# Patient Record
Sex: FEMALE | Race: OTHER | NOT HISPANIC OR LATINO
[De-identification: names, ages, dates, MRNs, and addresses within clinical notes are randomized per-mention and may not be internally consistent; named-entity substitution may affect disease eponyms.]

---

## 2018-12-20 ENCOUNTER — APPOINTMENT (OUTPATIENT)
Dept: HEART AND VASCULAR | Facility: CLINIC | Age: 78
End: 2018-12-20
Payer: MEDICARE

## 2018-12-20 VITALS — SYSTOLIC BLOOD PRESSURE: 130 MMHG | DIASTOLIC BLOOD PRESSURE: 70 MMHG

## 2018-12-20 VITALS — BODY MASS INDEX: 28.72 KG/M2 | HEIGHT: 67 IN | WEIGHT: 183 LBS

## 2018-12-20 DIAGNOSIS — Z87.39 PERSONAL HISTORY OF OTHER DISEASES OF THE MUSCULOSKELETAL SYSTEM AND CONNECTIVE TISSUE: ICD-10-CM

## 2018-12-20 PROCEDURE — 93000 ELECTROCARDIOGRAM COMPLETE: CPT

## 2018-12-20 PROCEDURE — 93306 TTE W/DOPPLER COMPLETE: CPT

## 2018-12-20 PROCEDURE — 93880 EXTRACRANIAL BILAT STUDY: CPT

## 2018-12-20 PROCEDURE — 99214 OFFICE O/P EST MOD 30 MIN: CPT

## 2018-12-20 NOTE — PHYSICAL EXAM
[General Appearance - Well Developed] : well developed [Normal Appearance] : normal appearance [Well Groomed] : well groomed [General Appearance - Well Nourished] : well nourished [No Deformities] : no deformities [General Appearance - In No Acute Distress] : no acute distress [Normal Conjunctiva] : the conjunctiva exhibited no abnormalities [Eyelids - No Xanthelasma] : the eyelids demonstrated no xanthelasmas [Normal Oral Mucosa] : normal oral mucosa [No Oral Pallor] : no oral pallor [No Oral Cyanosis] : no oral cyanosis [Normal Jugular Venous A Waves Present] : normal jugular venous A waves present [Normal Jugular Venous V Waves Present] : normal jugular venous V waves present [No Jugular Venous Barrios A Waves] : no jugular venous barrios A waves [Respiration, Rhythm And Depth] : normal respiratory rhythm and effort [Exaggerated Use Of Accessory Muscles For Inspiration] : no accessory muscle use [Auscultation Breath Sounds / Voice Sounds] : lungs were clear to auscultation bilaterally [Normal] : normal [Rhythm Regular] : regular [Normal S1] : normal S1 [Normal S2] : normal S2 [II] : a grade 2 [Right Carotid Bruit] : right carotid bruit heard [Left Carotid Bruit] : left carotid bruit heard [Abdomen Soft] : soft [Abdomen Tenderness] : non-tender [Abdomen Mass (___ Cm)] : no abdominal mass palpated [Abnormal Walk] : normal gait [Gait - Sufficient For Exercise Testing] : the gait was sufficient for exercise testing [Nail Clubbing] : no clubbing of the fingernails [Cyanosis, Localized] : no localized cyanosis [Petechial Hemorrhages (___cm)] : no petechial hemorrhages [Skin Color & Pigmentation] : normal skin color and pigmentation [] : no rash [No Venous Stasis] : no venous stasis [Skin Lesions] : no skin lesions [No Skin Ulcers] : no skin ulcer [No Xanthoma] : no  xanthoma was observed

## 2018-12-20 NOTE — ASSESSMENT
[FreeTextEntry1] : Echocardiogram revealed thickening of the aortic valve with mild degree of stenosis and mild to moderate aortic insufficiency chamber size is nondilated and LV function is well-preserved

## 2018-12-20 NOTE — HISTORY OF PRESENT ILLNESS
[FreeTextEntry1] : Patient is an 78-year-old white female with a history of hyperlipidemia hypertension long-standing aortic insufficiency. Patient returns for routine followup her mobility is severely limited due to ongoing back pain for which she has received pain injections with some relief. She has intermittent dizziness and ataxia which affects her balance
Please see your doctor in next few days. Take Theraflu as directed. Drink lots of fluids and rest. Come back if you develop severe headache, chest pain, trouble breathing, leg swelling, stiff neck, or not able to hold down liquids.

## 2019-06-06 ENCOUNTER — APPOINTMENT (OUTPATIENT)
Dept: HEART AND VASCULAR | Facility: CLINIC | Age: 79
End: 2019-06-06
Payer: MEDICARE

## 2019-06-06 ENCOUNTER — RESULT CHARGE (OUTPATIENT)
Age: 79
End: 2019-06-06

## 2019-06-06 VITALS — WEIGHT: 185 LBS | HEIGHT: 67 IN | BODY MASS INDEX: 29.03 KG/M2

## 2019-06-06 VITALS — SYSTOLIC BLOOD PRESSURE: 138 MMHG | DIASTOLIC BLOOD PRESSURE: 80 MMHG

## 2019-06-06 PROCEDURE — 93000 ELECTROCARDIOGRAM COMPLETE: CPT

## 2019-06-06 PROCEDURE — 99214 OFFICE O/P EST MOD 30 MIN: CPT

## 2019-06-06 NOTE — HISTORY OF PRESENT ILLNESS
[FreeTextEntry1] : Patient is an 78-year-old white female with a history of hyperlipidemia hypertension long-standing aortic insufficiency. Patient returns for routine followup her mobility is severely limited due to ongoing back pain for which she has received pain injections with some relief. She has intermittent dizziness and ataxia which affects her balance\par Worseing leg varicose veinsLeft>rIight with pain while walking and periodic swelling

## 2019-06-06 NOTE — PHYSICAL EXAM
[Well Groomed] : well groomed [General Appearance - Well Developed] : well developed [Normal Appearance] : normal appearance [General Appearance - Well Nourished] : well nourished [No Deformities] : no deformities [General Appearance - In No Acute Distress] : no acute distress [Normal Conjunctiva] : the conjunctiva exhibited no abnormalities [Eyelids - No Xanthelasma] : the eyelids demonstrated no xanthelasmas [No Oral Cyanosis] : no oral cyanosis [No Oral Pallor] : no oral pallor [Normal Oral Mucosa] : normal oral mucosa [Normal Jugular Venous A Waves Present] : normal jugular venous A waves present [Normal Jugular Venous V Waves Present] : normal jugular venous V waves present [No Jugular Venous Barrios A Waves] : no jugular venous barrios A waves [Auscultation Breath Sounds / Voice Sounds] : lungs were clear to auscultation bilaterally [Respiration, Rhythm And Depth] : normal respiratory rhythm and effort [Exaggerated Use Of Accessory Muscles For Inspiration] : no accessory muscle use [Abdomen Soft] : soft [Abdomen Tenderness] : non-tender [Gait - Sufficient For Exercise Testing] : the gait was sufficient for exercise testing [Abdomen Mass (___ Cm)] : no abdominal mass palpated [Abnormal Walk] : normal gait [Cyanosis, Localized] : no localized cyanosis [Nail Clubbing] : no clubbing of the fingernails [Petechial Hemorrhages (___cm)] : no petechial hemorrhages [] : no rash [Skin Color & Pigmentation] : normal skin color and pigmentation [No Venous Stasis] : no venous stasis [Skin Lesions] : no skin lesions [No Skin Ulcers] : no skin ulcer [Normal] : normal [No Xanthoma] : no  xanthoma was observed [Rhythm Regular] : regular [Normal S1] : normal S1 [Normal S2] : normal S2 [II] : a grade 2 [Right Carotid Bruit] : right carotid bruit heard [Left Carotid Bruit] : left carotid bruit heard [___ +] : bilateral [unfilled]U+ pitting edema to the ankles [Lt] : varicose veins of the left leg noted

## 2019-06-10 ENCOUNTER — APPOINTMENT (OUTPATIENT)
Dept: HEART AND VASCULAR | Facility: CLINIC | Age: 79
End: 2019-06-10
Payer: MEDICARE

## 2019-06-10 PROCEDURE — 93970 EXTREMITY STUDY: CPT

## 2019-07-08 ENCOUNTER — APPOINTMENT (OUTPATIENT)
Dept: HEART AND VASCULAR | Facility: CLINIC | Age: 79
End: 2019-07-08
Payer: MEDICARE

## 2019-07-08 VITALS — BODY MASS INDEX: 28.88 KG/M2 | WEIGHT: 184 LBS | HEIGHT: 67 IN

## 2019-07-08 VITALS — DIASTOLIC BLOOD PRESSURE: 80 MMHG | SYSTOLIC BLOOD PRESSURE: 130 MMHG

## 2019-07-08 DIAGNOSIS — F41.9 ANXIETY DISORDER, UNSPECIFIED: ICD-10-CM

## 2019-07-08 PROCEDURE — 93000 ELECTROCARDIOGRAM COMPLETE: CPT

## 2019-07-08 NOTE — HISTORY OF PRESENT ILLNESS
[FreeTextEntry1] : 79-year-old female with hypertension and aortic insufficiency who while over the weekend had excess salt intake was seen and evaluated at a local hospital for spike and blood pressure to 170/110. She denies any symptoms of headache dizziness chest pain shortness of breath diplopia or epistaxis. While in the emergency room her blood pressure spontaneous else to 130/80 with no intervention she had taken an extra half atenolol prior to going to the ER. She is currently preop for a colonoscopy to the [facility] cologaurd \par the patient reports moderate distress related to the upcoming colonoscopy

## 2019-12-12 ENCOUNTER — APPOINTMENT (OUTPATIENT)
Dept: HEART AND VASCULAR | Facility: CLINIC | Age: 79
End: 2019-12-12
Payer: MEDICARE

## 2019-12-12 ENCOUNTER — NON-APPOINTMENT (OUTPATIENT)
Age: 79
End: 2019-12-12

## 2019-12-12 VITALS
DIASTOLIC BLOOD PRESSURE: 75 MMHG | BODY MASS INDEX: 28.72 KG/M2 | HEART RATE: 68 BPM | SYSTOLIC BLOOD PRESSURE: 132 MMHG | HEIGHT: 67 IN | WEIGHT: 183 LBS

## 2019-12-12 PROCEDURE — 93880 EXTRACRANIAL BILAT STUDY: CPT

## 2019-12-12 PROCEDURE — 93000 ELECTROCARDIOGRAM COMPLETE: CPT

## 2019-12-12 PROCEDURE — 93306 TTE W/DOPPLER COMPLETE: CPT

## 2019-12-12 PROCEDURE — 99214 OFFICE O/P EST MOD 30 MIN: CPT

## 2019-12-12 NOTE — PHYSICAL EXAM
[General Appearance - Well Developed] : well developed [Normal Appearance] : normal appearance [No Deformities] : no deformities [Well Groomed] : well groomed [General Appearance - Well Nourished] : well nourished [Normal Conjunctiva] : the conjunctiva exhibited no abnormalities [General Appearance - In No Acute Distress] : no acute distress [Normal Oral Mucosa] : normal oral mucosa [No Oral Pallor] : no oral pallor [Eyelids - No Xanthelasma] : the eyelids demonstrated no xanthelasmas [Normal Jugular Venous A Waves Present] : normal jugular venous A waves present [Normal Jugular Venous V Waves Present] : normal jugular venous V waves present [No Oral Cyanosis] : no oral cyanosis [No Jugular Venous Barrios A Waves] : no jugular venous barrios A waves [Exaggerated Use Of Accessory Muscles For Inspiration] : no accessory muscle use [Respiration, Rhythm And Depth] : normal respiratory rhythm and effort [Auscultation Breath Sounds / Voice Sounds] : lungs were clear to auscultation bilaterally [Heart Rate And Rhythm] : heart rate and rhythm were normal [Heart Sounds] : normal S1 and S2 [Murmurs] : no murmurs present [Abdomen Soft] : soft [Abdomen Tenderness] : non-tender [Abnormal Walk] : normal gait [Abdomen Mass (___ Cm)] : no abdominal mass palpated [Nail Clubbing] : no clubbing of the fingernails [Gait - Sufficient For Exercise Testing] : the gait was sufficient for exercise testing [Petechial Hemorrhages (___cm)] : no petechial hemorrhages [Cyanosis, Localized] : no localized cyanosis [] : no ischemic changes

## 2019-12-18 NOTE — HISTORY OF PRESENT ILLNESS
[FreeTextEntry1] : 80 followed for AV disease\par had extensive GI eval recently\par Has mod dyspnea which is worse over 2-3 mos \par occasional imbalance as well

## 2019-12-18 NOTE — ASSESSMENT
[FreeTextEntry1] : iMPRESSION Lvef on echo 55% MIULD VHD \par Carotid Nl LICA JESSICA and VERT arteries \par contine curent meds \par xanax prn \par target LDL <100

## 2020-05-27 ENCOUNTER — APPOINTMENT (OUTPATIENT)
Dept: HEART AND VASCULAR | Facility: CLINIC | Age: 80
End: 2020-05-27
Payer: MEDICARE

## 2020-05-27 ENCOUNTER — NON-APPOINTMENT (OUTPATIENT)
Age: 80
End: 2020-05-27

## 2020-05-27 VITALS
BODY MASS INDEX: 28.56 KG/M2 | SYSTOLIC BLOOD PRESSURE: 120 MMHG | WEIGHT: 182 LBS | HEIGHT: 67 IN | DIASTOLIC BLOOD PRESSURE: 70 MMHG

## 2020-05-27 PROCEDURE — 93000 ELECTROCARDIOGRAM COMPLETE: CPT

## 2020-05-27 PROCEDURE — 36415 COLL VENOUS BLD VENIPUNCTURE: CPT

## 2020-05-27 PROCEDURE — 99214 OFFICE O/P EST MOD 30 MIN: CPT

## 2020-05-28 NOTE — ASSESSMENT
[FreeTextEntry1] : Assessment \par pain syndrome is suggestive of cervical radiculopathy\par PT recommended \par tylenol prn

## 2020-05-28 NOTE — HISTORY OF PRESENT ILLNESS
[FreeTextEntry1] : Patient has been in isolation for covid \par Has had left neck and left arm pain radiating in nature usually after nights sleep No chest pain or dyspnea \par Has prior h/o cervical DJD

## 2020-05-28 NOTE — REVIEW OF SYSTEMS
[Joint Pain] : joint pain [Muscle Cramps] : muscle cramps [Shortness Of Breath] : no shortness of breath [Chest Pain] : no chest pain [Skin: A Rash] : no rash: [Skin Lesions] : no skin lesions

## 2020-05-28 NOTE — PHYSICAL EXAM
[General Appearance - Well Developed] : well developed [Normal Appearance] : normal appearance [Well Groomed] : well groomed [General Appearance - Well Nourished] : well nourished [No Deformities] : no deformities [General Appearance - In No Acute Distress] : no acute distress [Normal Conjunctiva] : the conjunctiva exhibited no abnormalities [Eyelids - No Xanthelasma] : the eyelids demonstrated no xanthelasmas [Normal Oral Mucosa] : normal oral mucosa [No Oral Pallor] : no oral pallor [No Oral Cyanosis] : no oral cyanosis [Normal Jugular Venous A Waves Present] : normal jugular venous A waves present [Normal Jugular Venous V Waves Present] : normal jugular venous V waves present [No Jugular Venous Barrios A Waves] : no jugular venous barrios A waves [] : no respiratory distress [Respiration, Rhythm And Depth] : normal respiratory rhythm and effort [Exaggerated Use Of Accessory Muscles For Inspiration] : no accessory muscle use [Auscultation Breath Sounds / Voice Sounds] : lungs were clear to auscultation bilaterally [II] : a grade 2 [FreeTextEntry1] : pinpoint pain over left neck Mild pain w abduction left arm

## 2020-12-02 ENCOUNTER — NON-APPOINTMENT (OUTPATIENT)
Age: 80
End: 2020-12-02

## 2020-12-02 ENCOUNTER — APPOINTMENT (OUTPATIENT)
Dept: HEART AND VASCULAR | Facility: CLINIC | Age: 80
End: 2020-12-02
Payer: MEDICARE

## 2020-12-02 VITALS
BODY MASS INDEX: 28.25 KG/M2 | SYSTOLIC BLOOD PRESSURE: 130 MMHG | HEART RATE: 71 BPM | HEIGHT: 67 IN | DIASTOLIC BLOOD PRESSURE: 80 MMHG | WEIGHT: 180 LBS

## 2020-12-02 DIAGNOSIS — R26.9 UNSPECIFIED ABNORMALITIES OF GAIT AND MOBILITY: ICD-10-CM

## 2020-12-02 PROCEDURE — 99214 OFFICE O/P EST MOD 30 MIN: CPT

## 2020-12-02 PROCEDURE — 93000 ELECTROCARDIOGRAM COMPLETE: CPT

## 2020-12-04 NOTE — ASSESSMENT
[FreeTextEntry1] : Assessment \par Echo f/u for AV disease \par Carotid for vertigo symptoms \par meds reviewed

## 2020-12-04 NOTE — HISTORY OF PRESENT ILLNESS
[FreeTextEntry1] : Minimal activity outside of house \par Had severe vertigo worse w movement lasted 2 hours and self resolved has C spine pain along w back pain

## 2020-12-15 ENCOUNTER — APPOINTMENT (OUTPATIENT)
Dept: HEART AND VASCULAR | Facility: CLINIC | Age: 80
End: 2020-12-15
Payer: MEDICARE

## 2020-12-15 PROCEDURE — 93306 TTE W/DOPPLER COMPLETE: CPT

## 2020-12-15 PROCEDURE — 93880 EXTRACRANIAL BILAT STUDY: CPT

## 2021-05-04 ENCOUNTER — NON-APPOINTMENT (OUTPATIENT)
Age: 81
End: 2021-05-04

## 2021-05-04 ENCOUNTER — APPOINTMENT (OUTPATIENT)
Dept: HEART AND VASCULAR | Facility: CLINIC | Age: 81
End: 2021-05-04
Payer: MEDICARE

## 2021-05-04 VITALS
HEIGHT: 67 IN | DIASTOLIC BLOOD PRESSURE: 80 MMHG | WEIGHT: 186 LBS | BODY MASS INDEX: 29.19 KG/M2 | SYSTOLIC BLOOD PRESSURE: 130 MMHG | HEART RATE: 75 BPM

## 2021-05-04 PROCEDURE — 99213 OFFICE O/P EST LOW 20 MIN: CPT

## 2021-05-04 PROCEDURE — 93000 ELECTROCARDIOGRAM COMPLETE: CPT

## 2021-05-04 NOTE — PHYSICAL EXAM
[5th Left ICS - MCL] : palpated at the 5th LICS in the midclavicular line [Rhythm Regular] : regular [Normal S1] : normal S1 [Normal S2] : normal S2 [Right Carotid Bruit] : right carotid bruit heard [Left Carotid Bruit] : left carotid bruit heard [General Appearance - Well Developed] : well developed [Normal Appearance] : normal appearance [Well Groomed] : well groomed [General Appearance - Well Nourished] : well nourished [No Deformities] : no deformities [General Appearance - In No Acute Distress] : no acute distress [Normal Conjunctiva] : the conjunctiva exhibited no abnormalities [Eyelids - No Xanthelasma] : the eyelids demonstrated no xanthelasmas [Normal Oral Mucosa] : normal oral mucosa [No Oral Pallor] : no oral pallor [No Oral Cyanosis] : no oral cyanosis [Normal Jugular Venous A Waves Present] : normal jugular venous A waves present [Normal Jugular Venous V Waves Present] : normal jugular venous V waves present [No Jugular Venous Barrios A Waves] : no jugular venous barrios A waves [] : no respiratory distress [Respiration, Rhythm And Depth] : normal respiratory rhythm and effort [Exaggerated Use Of Accessory Muscles For Inspiration] : no accessory muscle use [Auscultation Breath Sounds / Voice Sounds] : lungs were clear to auscultation bilaterally [FreeTextEntry1] : pinpoint pain over left neck Mild pain w abduction left arm  [II] : a grade 2

## 2021-05-04 NOTE — HISTORY OF PRESENT ILLNESS
[FreeTextEntry1] : Minimal activity outside of house \par Had severe vertigo worse w movement lasted 2 hours and self resolved has C spine pain along w back pain\par recent LDL 11 down from 128

## 2021-05-04 NOTE — ASSESSMENT
[FreeTextEntry1] : Assessment \par LDL at goal \par NBp stable \par Ekg unchanged \par risk factor changes discussed

## 2021-12-16 ENCOUNTER — APPOINTMENT (OUTPATIENT)
Dept: HEART AND VASCULAR | Facility: CLINIC | Age: 81
End: 2021-12-16
Payer: MEDICARE

## 2021-12-16 ENCOUNTER — NON-APPOINTMENT (OUTPATIENT)
Age: 81
End: 2021-12-16

## 2021-12-16 VITALS
HEART RATE: 77 BPM | SYSTOLIC BLOOD PRESSURE: 125 MMHG | BODY MASS INDEX: 28.25 KG/M2 | HEIGHT: 67 IN | RESPIRATION RATE: 14 BRPM | DIASTOLIC BLOOD PRESSURE: 75 MMHG | WEIGHT: 180 LBS

## 2021-12-16 PROCEDURE — 93306 TTE W/DOPPLER COMPLETE: CPT

## 2021-12-16 PROCEDURE — 93000 ELECTROCARDIOGRAM COMPLETE: CPT

## 2021-12-16 PROCEDURE — 99214 OFFICE O/P EST MOD 30 MIN: CPT

## 2021-12-16 NOTE — PHYSICAL EXAM
[5th Left ICS - MCL] : palpated at the 5th LICS in the midclavicular line [Rhythm Regular] : regular [Normal S1] : normal S1 [Normal S2] : normal S2 [Right Carotid Bruit] : right carotid bruit heard [Left Carotid Bruit] : left carotid bruit heard 110 [General Appearance - Well Developed] : well developed [Normal Appearance] : normal appearance [Well Groomed] : well groomed [General Appearance - Well Nourished] : well nourished [No Deformities] : no deformities [General Appearance - In No Acute Distress] : no acute distress [Normal Conjunctiva] : the conjunctiva exhibited no abnormalities [Eyelids - No Xanthelasma] : the eyelids demonstrated no xanthelasmas [Normal Oral Mucosa] : normal oral mucosa [No Oral Pallor] : no oral pallor [No Oral Cyanosis] : no oral cyanosis [Normal Jugular Venous A Waves Present] : normal jugular venous A waves present [Normal Jugular Venous V Waves Present] : normal jugular venous V waves present [No Jugular Venous Barrios A Waves] : no jugular venous barrios A waves [] : no respiratory distress [Exaggerated Use Of Accessory Muscles For Inspiration] : no accessory muscle use [Respiration, Rhythm And Depth] : normal respiratory rhythm and effort [Auscultation Breath Sounds / Voice Sounds] : lungs were clear to auscultation bilaterally [II] : a grade 2 [FreeTextEntry1] : pinpoint pain over left neck Mild pain w abduction left arm

## 2021-12-16 NOTE — HISTORY OF PRESENT ILLNESS
[FreeTextEntry1] : walking limited by severe LBP and knee pain \par no dizziness or sscp mild leg edema \par no change in dyspnea pattern \par no syystemic complaints

## 2022-06-09 ENCOUNTER — NON-APPOINTMENT (OUTPATIENT)
Age: 82
End: 2022-06-09

## 2022-06-09 ENCOUNTER — APPOINTMENT (OUTPATIENT)
Dept: HEART AND VASCULAR | Facility: CLINIC | Age: 82
End: 2022-06-09
Payer: MEDICARE

## 2022-06-09 VITALS
RESPIRATION RATE: 14 BRPM | WEIGHT: 186 LBS | BODY MASS INDEX: 29.19 KG/M2 | DIASTOLIC BLOOD PRESSURE: 78 MMHG | HEIGHT: 67 IN | SYSTOLIC BLOOD PRESSURE: 125 MMHG | HEART RATE: 83 BPM

## 2022-06-09 DIAGNOSIS — I83.019 VARICOSE VEINS OF RIGHT LOWER EXTREMITY WITH ULCER OF UNSPECIFIED SITE: ICD-10-CM

## 2022-06-09 DIAGNOSIS — L97.929 VARICOSE VEINS OF RIGHT LOWER EXTREMITY WITH ULCER OF UNSPECIFIED SITE: ICD-10-CM

## 2022-06-09 DIAGNOSIS — L97.919 VARICOSE VEINS OF RIGHT LOWER EXTREMITY WITH ULCER OF UNSPECIFIED SITE: ICD-10-CM

## 2022-06-09 DIAGNOSIS — I83.029 VARICOSE VEINS OF RIGHT LOWER EXTREMITY WITH ULCER OF UNSPECIFIED SITE: ICD-10-CM

## 2022-06-09 PROCEDURE — 93000 ELECTROCARDIOGRAM COMPLETE: CPT

## 2022-06-09 PROCEDURE — 99214 OFFICE O/P EST MOD 30 MIN: CPT

## 2022-06-09 NOTE — ASSESSMENT
[FreeTextEntry1] : Assessment \par BP Mekg unchnanged \par AV disease stable on exam no signs of chf \par was off statin for a period is now back on taget ldl <100

## 2022-06-09 NOTE — PHYSICAL EXAM
[5th Left ICS - MCL] : palpated at the 5th LICS in the midclavicular line [Rhythm Regular] : regular [Normal S1] : normal S1 [Normal S2] : normal S2 [Right Carotid Bruit] : right carotid bruit heard [Left Carotid Bruit] : left carotid bruit heard [General Appearance - Well Developed] : well developed [Normal Appearance] : normal appearance [Well Groomed] : well groomed [General Appearance - Well Nourished] : well nourished [No Deformities] : no deformities [General Appearance - In No Acute Distress] : no acute distress [Normal Conjunctiva] : the conjunctiva exhibited no abnormalities [Eyelids - No Xanthelasma] : the eyelids demonstrated no xanthelasmas [Normal Oral Mucosa] : normal oral mucosa [No Oral Pallor] : no oral pallor [No Oral Cyanosis] : no oral cyanosis [Normal Jugular Venous A Waves Present] : normal jugular venous A waves present [Normal Jugular Venous V Waves Present] : normal jugular venous V waves present [No Jugular Venous Barrios A Waves] : no jugular venous barrios A waves [] : no respiratory distress [Respiration, Rhythm And Depth] : normal respiratory rhythm and effort [Exaggerated Use Of Accessory Muscles For Inspiration] : no accessory muscle use [Auscultation Breath Sounds / Voice Sounds] : lungs were clear to auscultation bilaterally [II] : a grade 2 [FreeTextEntry1] : pinpoint pain over left neck Mild pain w abduction left arm

## 2022-06-09 NOTE — HISTORY OF PRESENT ILLNESS
[FreeTextEntry1] : walking limited by severe LBP and knee pain \par no dizziness or sscp mild leg edema \par no change in dyspnea pattern \par no systemic complaints

## 2022-12-22 ENCOUNTER — APPOINTMENT (OUTPATIENT)
Dept: HEART AND VASCULAR | Facility: CLINIC | Age: 82
End: 2022-12-22
Payer: MEDICARE

## 2022-12-22 ENCOUNTER — NON-APPOINTMENT (OUTPATIENT)
Age: 82
End: 2022-12-22

## 2022-12-22 VITALS
WEIGHT: 174 LBS | HEIGHT: 67 IN | DIASTOLIC BLOOD PRESSURE: 85 MMHG | HEART RATE: 79 BPM | BODY MASS INDEX: 27.31 KG/M2 | SYSTOLIC BLOOD PRESSURE: 134 MMHG

## 2022-12-22 DIAGNOSIS — R27.0 ATAXIA, UNSPECIFIED: ICD-10-CM

## 2022-12-22 PROCEDURE — 93880 EXTRACRANIAL BILAT STUDY: CPT

## 2022-12-22 PROCEDURE — ZZZZZ: CPT

## 2022-12-22 PROCEDURE — 99214 OFFICE O/P EST MOD 30 MIN: CPT | Mod: 25

## 2022-12-22 PROCEDURE — 93306 TTE W/DOPPLER COMPLETE: CPT

## 2022-12-22 PROCEDURE — 93000 ELECTROCARDIOGRAM COMPLETE: CPT | Mod: 59

## 2022-12-22 NOTE — ADDENDUM
[FreeTextEntry1] : I, Romain Daniel, assisted in documentation on 12/22/2022 acting as a scribe for Dr. Parviz Peterson.\par \par

## 2022-12-22 NOTE — DISCUSSION/SUMMARY
[FreeTextEntry1] : The impression is hyperlipidemia. Currently, this condition is stable. Other planned treatment includes diet modification. The plan was discussed with the patient.\par Hypertension: The impression is hypertension. Currently, the condition is stable. The plan was discussed with the patient.\par AV dsease stable no intervention needed  [EKG obtained to assist in diagnosis and management of assessed problem(s)] : EKG obtained to assist in diagnosis and management of assessed problem(s)

## 2022-12-22 NOTE — HISTORY OF PRESENT ILLNESS
[FreeTextEntry1] : 6/9/22 walking limited by severe LBP and knee pain \par no dizziness or sscp mild leg edema \par no change in dyspnea pattern \par no systemic complaints \par 12/22/22\par Denies Chest Pain, SOB, Dizziness, Leg edema, Orthopnea, PND, Palpitations, Syncope, WHITE, Diaphoresis.\par Had covid 8/22 followed by facial shingles seen by Dr Dwyer on GABAPENTIN for neuropathy \par some vision loss

## 2022-12-22 NOTE — ASSESSMENT
[FreeTextEntry1] : HTN (hypertension) (401.9) (I10)\par Aortic valve insufficiency, acquired (424.1) (I35.1)\par Venous stasis ulcers of both lower extremities (459.81,707.10)\par (I83.019,I83.029,L97.919,L97.929)\par Hyperlipidemia (272.4) (E78.5)\par Lvef 55% MILD AS MILD AI MR \par CAROTID IRR Plaque no obstructive\par \par Assessment \par BP ekg unchanged \par AV disease stable on exam no signs of chf \par was off statin for a period is now back on target ldl <100.

## 2022-12-22 NOTE — PHYSICAL EXAM
[Well Developed] : well developed [Well Nourished] : well nourished [No Acute Distress] : no acute distress [Normal Conjunctiva] : normal conjunctiva [Normal Venous Pressure] : normal venous pressure [No Carotid Bruit] : no carotid bruit [Clear Lung Fields] : clear lung fields [Good Air Entry] : good air entry [No Respiratory Distress] : no respiratory distress  [Soft] : abdomen soft [Non Tender] : non-tender [No Masses/organomegaly] : no masses/organomegaly [Normal Bowel Sounds] : normal bowel sounds [No Edema] : no edema [No Cyanosis] : no cyanosis [No Clubbing] : no clubbing [No Varicosities] : no varicosities [No Rash] : no rash [No Skin Lesions] : no skin lesions [Moves all extremities] : moves all extremities [No Focal Deficits] : no focal deficits [Normal Speech] : normal speech [Alert and Oriented] : alert and oriented [Normal memory] : normal memory [de-identified] : normal oral mucosa, no oral pallor and no oral cyanosis. [de-identified] : Cardiovascular: The PMI was palpated at the 5th LICS in the midclavicular line. The rhythm was regular. Heart sounds: normal S1, normal S2. \par A grade 2 systolic murmur was heard at the RUSB. \par A grade 2 diastolic murmur was heard at the LLSB. Carotid: right carotid bruit heard and left carotid bruit heard. \par  [de-identified] : pinpoint pain over left neck Mild pain w abduction left arm.

## 2023-06-20 ENCOUNTER — APPOINTMENT (OUTPATIENT)
Dept: HEART AND VASCULAR | Facility: CLINIC | Age: 83
End: 2023-06-20
Payer: MEDICARE

## 2023-06-20 ENCOUNTER — NON-APPOINTMENT (OUTPATIENT)
Age: 83
End: 2023-06-20

## 2023-06-20 VITALS
HEART RATE: 80 BPM | RESPIRATION RATE: 14 BRPM | BODY MASS INDEX: 28.88 KG/M2 | WEIGHT: 184 LBS | DIASTOLIC BLOOD PRESSURE: 72 MMHG | SYSTOLIC BLOOD PRESSURE: 128 MMHG | HEIGHT: 67 IN

## 2023-06-20 PROCEDURE — 99214 OFFICE O/P EST MOD 30 MIN: CPT

## 2023-06-20 PROCEDURE — 93000 ELECTROCARDIOGRAM COMPLETE: CPT

## 2023-06-20 NOTE — DISCUSSION/SUMMARY
[FreeTextEntry1] : The impression is hyperlipidemia. Currently, this condition is stable. Other planned treatment includes diet modification. The plan was discussed with the patient.\par Hypertension: The impression is hypertension. Currently, the condition is stable. The plan was discussed with the patient.\par AV dsease stable no intervention needed. \par \par Reduce saturated and trans fats: These types of fats can raise your LDL cholesterol levels. Avoid or limit foods high in saturated fats, such as fatty meats, full-fat dairy products, butter, lard, and tropical oils like coconut and palm oil. Trans fats are commonly found in processed and fried foods, so it is best to avoid or minimize consumption of items like fried snacks, baked goods and margarine. Choose healthier fats: Include foods rich in unsaturated fats, which can help lower LDL cholesterol levels. Good source of unsaturated fats include avocados, nuts and seeds (such as almonds, walnuts and jin seeds), olive oil and fatty fish like salmon and mackerel.\par Increase dietary fiber: Foods high in soluble fiber can help lower cholesterol levels. Include plenty of fruits, vegetables, whole grains and legumes in your diet. Good sources of soluble fiber includes oats, barley, beans, lentils, apples, oranges, and carrots. Eat lean protein: Choose lean sources of protein, such as skinless poultry, fish, legumes and tofu, instead of fatty meats. If you consume red meat, opt for lean cuts and limit the portion size. Include omega-3 fatty acids: Omega-3 fatty acids can help lower cholesterol levels and reduce the risk of heart disease. Include fatty fish like salmon, trout, and sardines in your diet. If you don't eat fish, consider taking a fish oil supplement after consulting your healthcare provider. Limit cholesterol-rich foods: Reduce your intake of foods high in cholesterol, such as organ meats, shellfish, and egg yolks. While dietary cholesterol, such as organ meats, shellfish and egg yolks. While dietary cholesterol has less impact on blood cholesterol levels compared to saturated and trans fats, it's still beneficial to moderate your consumption. Be mindful of added sugars and refined carbohydrates: High intake of added sugars and refined carbohydrates can raise triglyceride levels and lower HDL cholesterol (the "good" cholesterol). Limit sugary drinks, sweets, white bread, white rice, and other processed foods. Exercise regularly: Regular physical activity, such as a brisk walking, jogging, or cycling, can help improve cholesterol levels. Aim for at least 150 minutes of moderate-intensity aerobic exercise per week, or as recommended by your healthcare provider.\par

## 2023-06-20 NOTE — HISTORY OF PRESENT ILLNESS
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

## 2023-06-20 NOTE — ADDENDUM
[FreeTextEntry1] : I, Romain Daniel, assisted in documentation on 06/20/2023 acting as a scribe for Dr. Parviz Peterson.\par \par

## 2023-06-20 NOTE — ASSESSMENT
[FreeTextEntry1] : HTN (hypertension) (401.9) (I10)\par Aortic valve insufficiency, acquired (424.1) (I35.1)\par Ataxia, unspecified (781.3) (R27.0)\par \par Venous stasis ulcers of both lower extremities (459.81,707.10)\par (I83.019,I83.029,L97.919,L97.929)\par Hyperlipidemia (272.4) (E78.5)\par \par \par Assessment \par BP ekg unchanged \par AV disease stable on exam no signs of chf \par was off statin for a period is now back on target ldl <100. \par \par

## 2023-06-20 NOTE — PHYSICAL EXAM
[Well Developed] : well developed [Well Nourished] : well nourished [No Acute Distress] : no acute distress [Normal Conjunctiva] : normal conjunctiva [Normal Venous Pressure] : normal venous pressure [No Carotid Bruit] : no carotid bruit [5th Left ICS - MCL] : palpated at the 5th LICS in the midclavicular line [Normal S1] : normal S1 [Normal S2] : normal S2 [II] : a grade 2 [Right Carotid Bruit] : right carotid bruit heard [Left Carotid Bruit] : left carotid bruit heard [Clear Lung Fields] : clear lung fields [Good Air Entry] : good air entry [No Respiratory Distress] : no respiratory distress  [Soft] : abdomen soft [Non Tender] : non-tender [No Masses/organomegaly] : no masses/organomegaly [Normal Bowel Sounds] : normal bowel sounds [Normal Gait] : normal gait [No Edema] : no edema [No Cyanosis] : no cyanosis [No Clubbing] : no clubbing [No Varicosities] : no varicosities [No Rash] : no rash [No Skin Lesions] : no skin lesions [Moves all extremities] : moves all extremities [No Focal Deficits] : no focal deficits [Normal Speech] : normal speech [Alert and Oriented] : alert and oriented [Normal memory] : normal memory [Rt] : varicose veins of the right leg noted [Lt] : varicose veins of the left leg noted

## 2024-01-09 ENCOUNTER — APPOINTMENT (OUTPATIENT)
Dept: HEART AND VASCULAR | Facility: CLINIC | Age: 84
End: 2024-01-09
Payer: MEDICARE

## 2024-01-09 ENCOUNTER — NON-APPOINTMENT (OUTPATIENT)
Age: 84
End: 2024-01-09

## 2024-01-09 VITALS
HEIGHT: 67 IN | WEIGHT: 190 LBS | RESPIRATION RATE: 14 BRPM | DIASTOLIC BLOOD PRESSURE: 82 MMHG | SYSTOLIC BLOOD PRESSURE: 130 MMHG | HEART RATE: 76 BPM | BODY MASS INDEX: 29.82 KG/M2

## 2024-01-09 DIAGNOSIS — M54.13: ICD-10-CM

## 2024-01-09 DIAGNOSIS — I35.1 NONRHEUMATIC AORTIC (VALVE) INSUFFICIENCY: ICD-10-CM

## 2024-01-09 DIAGNOSIS — I10 ESSENTIAL (PRIMARY) HYPERTENSION: ICD-10-CM

## 2024-01-09 DIAGNOSIS — E78.5 HYPERLIPIDEMIA, UNSPECIFIED: ICD-10-CM

## 2024-01-09 DIAGNOSIS — I35.0 NONRHEUMATIC AORTIC (VALVE) STENOSIS: ICD-10-CM

## 2024-01-09 PROCEDURE — 99214 OFFICE O/P EST MOD 30 MIN: CPT

## 2024-01-09 PROCEDURE — 93306 TTE W/DOPPLER COMPLETE: CPT

## 2024-01-09 PROCEDURE — 93000 ELECTROCARDIOGRAM COMPLETE: CPT

## 2024-01-09 PROCEDURE — G2211 COMPLEX E/M VISIT ADD ON: CPT

## 2024-01-09 PROCEDURE — 93880 EXTRACRANIAL BILAT STUDY: CPT

## 2024-07-16 ENCOUNTER — NON-APPOINTMENT (OUTPATIENT)
Age: 84
End: 2024-07-16

## 2024-07-16 ENCOUNTER — APPOINTMENT (OUTPATIENT)
Dept: HEART AND VASCULAR | Facility: CLINIC | Age: 84
End: 2024-07-16
Payer: MEDICARE

## 2024-07-16 VITALS
DIASTOLIC BLOOD PRESSURE: 75 MMHG | WEIGHT: 185 LBS | HEART RATE: 79 BPM | SYSTOLIC BLOOD PRESSURE: 130 MMHG | BODY MASS INDEX: 29.03 KG/M2 | RESPIRATION RATE: 14 BRPM | HEIGHT: 67 IN

## 2024-07-16 DIAGNOSIS — I35.0 NONRHEUMATIC AORTIC (VALVE) STENOSIS: ICD-10-CM

## 2024-07-16 DIAGNOSIS — I83.029 VARICOSE VEINS OF RIGHT LOWER EXTREMITY WITH ULCER OF UNSPECIFIED SITE: ICD-10-CM

## 2024-07-16 DIAGNOSIS — L97.919 VARICOSE VEINS OF RIGHT LOWER EXTREMITY WITH ULCER OF UNSPECIFIED SITE: ICD-10-CM

## 2024-07-16 DIAGNOSIS — I10 ESSENTIAL (PRIMARY) HYPERTENSION: ICD-10-CM

## 2024-07-16 DIAGNOSIS — I83.019 VARICOSE VEINS OF RIGHT LOWER EXTREMITY WITH ULCER OF UNSPECIFIED SITE: ICD-10-CM

## 2024-07-16 DIAGNOSIS — L97.929 VARICOSE VEINS OF RIGHT LOWER EXTREMITY WITH ULCER OF UNSPECIFIED SITE: ICD-10-CM

## 2024-07-16 PROCEDURE — 93000 ELECTROCARDIOGRAM COMPLETE: CPT

## 2024-07-16 PROCEDURE — G2211 COMPLEX E/M VISIT ADD ON: CPT

## 2024-07-16 PROCEDURE — 99214 OFFICE O/P EST MOD 30 MIN: CPT

## 2024-07-16 RX ORDER — GABAPENTIN 100 MG/1
100 CAPSULE ORAL
Refills: 0 | Status: ACTIVE | COMMUNITY

## 2025-01-14 ENCOUNTER — APPOINTMENT (OUTPATIENT)
Dept: HEART AND VASCULAR | Facility: CLINIC | Age: 85
End: 2025-01-14
Payer: MEDICARE

## 2025-01-14 ENCOUNTER — NON-APPOINTMENT (OUTPATIENT)
Age: 85
End: 2025-01-14

## 2025-01-14 VITALS
WEIGHT: 185 LBS | HEIGHT: 67 IN | BODY MASS INDEX: 29.03 KG/M2 | HEART RATE: 74 BPM | RESPIRATION RATE: 14 BRPM | DIASTOLIC BLOOD PRESSURE: 85 MMHG | SYSTOLIC BLOOD PRESSURE: 148 MMHG

## 2025-01-14 DIAGNOSIS — Z00.00 ENCOUNTER FOR GENERAL ADULT MEDICAL EXAMINATION W/OUT ABNORMAL FINDINGS: ICD-10-CM

## 2025-01-14 PROCEDURE — G2211 COMPLEX E/M VISIT ADD ON: CPT

## 2025-01-14 PROCEDURE — 93306 TTE W/DOPPLER COMPLETE: CPT

## 2025-01-14 PROCEDURE — 93000 ELECTROCARDIOGRAM COMPLETE: CPT

## 2025-01-14 PROCEDURE — 99214 OFFICE O/P EST MOD 30 MIN: CPT

## 2025-02-03 ENCOUNTER — APPOINTMENT (OUTPATIENT)
Dept: CARDIOTHORACIC SURGERY | Facility: CLINIC | Age: 85
End: 2025-02-03
Payer: MEDICARE

## 2025-02-03 DIAGNOSIS — Z82.49 FAMILY HISTORY OF ISCHEMIC HEART DISEASE AND OTHER DISEASES OF THE CIRCULATORY SYSTEM: ICD-10-CM

## 2025-02-03 PROCEDURE — 99203 OFFICE O/P NEW LOW 30 MIN: CPT | Mod: 2W

## 2025-02-03 RX ORDER — MULTIVIT,IRON,MINERALS/LUTEIN
TABLET ORAL
Refills: 0 | Status: ACTIVE | COMMUNITY

## 2025-02-03 RX ORDER — MELOXICAM 7.5 MG/1
7.5 TABLET ORAL
Qty: 10 | Refills: 0 | Status: ACTIVE | COMMUNITY
Start: 2025-01-23

## 2025-02-03 RX ORDER — VIT A/VIT C/VIT E/ZINC/COPPER 4296-226
CAPSULE ORAL
Refills: 0 | Status: ACTIVE | COMMUNITY

## 2025-02-12 ENCOUNTER — NON-APPOINTMENT (OUTPATIENT)
Age: 85
End: 2025-02-12

## 2025-02-14 VITALS
DIASTOLIC BLOOD PRESSURE: 70 MMHG | HEART RATE: 83 BPM | RESPIRATION RATE: 18 BRPM | WEIGHT: 184.97 LBS | OXYGEN SATURATION: 95 % | SYSTOLIC BLOOD PRESSURE: 158 MMHG | HEIGHT: 67 IN

## 2025-02-14 NOTE — H&P ADULT - HISTORY OF PRESENT ILLNESS
cardiologist : Dr. Wynn  escort:   Pharmacy :     84 y o f with PMH of anxiety disorder, macular degeneration, ocular migraine, ataxia, post herpetic neuralgia, thyroid CA s/p partial thyroidectomy, hemorrhoids, HTN, HLD, HFpEF, LFLG severe AS presented to his cardiologist for consultation of treatment options for AS. Pt is c/o WHITE with moderate exertion and occasional chest tightness lasting 10-15 minutes at rest which she attributes to her anxiety. Pt denies dizziness, syncope, LE edema, PND/orthopnea, palpitations, n/f, fever/chills, blood in the stool.     ECHO 1/14/25 showed EF 55-60%, moderate grade 2 LV diastolic dysfunction, mild MR, mild TR, moderate LVH, moderate to severe AS (PG 45 mmHg, MG 26 mmHg, SUSI 0.82 ), normal BIV function.   Carotid US 1/9/24 showed right proximal, mid and distal ICA 1-19% stenosis; left proximal, mid and distal ICA 1-19% stenosis.    In light of pt's risk factors, CCS 3-4 anginal symptoms and abnormal ECHO, pt is now referred to [facility] for recommended cardiac cath for possible TAVR work-up.  cardiologist : Dr. Wynn  escort: daughter Sonal and  Valentin  Pharmacy : [facility]    84 y o f with PMH of anxiety disorder, macular degeneration, ocular migraine, ataxia, post herpetic neuralgia, thyroid CA s/p partial thyroidectomy, hemorrhoids, HTN, HLD, HFpEF, LFLG severe AS presented to his cardiologist for consultation of treatment options for AS. Pt is c/o WHITE with moderate exertion (however per daughter pt become short of breath after doing chores at home) and occasional chest tightness lasting 10-15 minutes at rest which she attributes to her anxiety/GERD. Pt reports LE edema that has been present intermittently for the past 1-2 years. About 6 months ago pt had a superificial thrombosis on the L LE for which she took 4 weeks of ASA, during that time she had episodes of hemorrhoidal bleeding which resolved after d/c ASA. Pt reports feeling pain when laying flat due to spinal stenosis however denies orthopnea. Pt denies dizziness, syncope, palpitations, n/v/d, fever/chills, hematuria, hematochezia, melena.     ECHO 1/14/25 showed EF 55-60%, moderate grade 2 LV diastolic dysfunction, mild MR, mild TR, moderate LVH, moderate to severe AS (PG 45 mmHg, MG 26 mmHg, SUSI 0.82 ), normal BIV function.   Carotid US 1/9/24 showed right proximal, mid and distal ICA 1-19% stenosis; left proximal, mid and distal ICA 1-19% stenosis.      In light of pt's risk factors, CCS 3-4 anginal symptoms and abnormal ECHO, pt is now referred to [facility] for recommended cardiac cath for possible TAVR work-up.

## 2025-02-14 NOTE — H&P ADULT - NSICDXPASTSURGICALHX_GEN_ALL_CORE_FT
PAST SURGICAL HISTORY:  H/O hernia repair     H/O hernia repair     S/P thyroidectomy     S/P total abdominal hysterectomy

## 2025-02-14 NOTE — H&P ADULT - ASSESSMENT
84 y o f with PMH of anxiety disorder, macular degeneration, ocular migraine, ataxia, post herpetic neuralgia, thyroid CA s/p partial thyroidectomy, hemorrhoids, HTN, HLD, HFpEF, LFLG severe AS presented to his cardiologist for consultation of treatment options for AS reporting occasional chest tightness, WHITE, and LE edema, who now presents to [facility] for recommended cardiac cath for possible TAVR work-up in light of pt's risk factors, CCS 3-4 anginal symptoms and abnormal ECHO.     - ASA III Mallampati II  - VSS  - Patient is a candidate for moderate sedation  - Labs reviewed by PA - H/H 13.2/40.5     - GFR/Cr 89/0.56  - EKG - NSR, HR 89  - LOAD - No load as [facility] is diagnostic   - FLUIDS -  NS 50 ml/hr given x 2hr in setting of severe AS    Risks & benefits of procedure and alternative therapy have been explained to the patient including but not limited to: allergic reaction, bleeding w/possible need for blood transfusion, infection, renal and vascular compromise, limb damage, arrhythmia, stroke, vessel dissection/perforation, Myocardial infarction, emergent CABG. Informed consent obtained and in chart.    84 y o f with PMH of anxiety disorder, macular degeneration, ocular migraine, ataxia, post herpetic neuralgia, thyroid CA s/p partial thyroidectomy, hemorrhoids, HTN, HLD, HFpEF, LFLG severe AS presented to his cardiologist for consultation of treatment options for AS reporting occasional chest tightness, WHITE, and LE edema, who now presents to [facility] for recommended cardiac cath for possible TAVR work-up in light of pt's risk factors, CCS 3-4 anginal symptoms and abnormal ECHO.     - ASA III Mallampati II  - VSS  - Patient is a candidate for moderate sedation  - Labs reviewed by PA - H/H 13.2/40.5     - GFR/Cr 89/0.56  - EKG - NSR, HR 89  - LOAD - No load as [facility] is diagnostic   - FLUIDS -  NS 50 ml/hr given x 2hr in setting of severe AS  - Pt had TAVR CT scans prior to cath for AS work-up    Risks & benefits of procedure and alternative therapy have been explained to the patient including but not limited to: allergic reaction, bleeding w/possible need for blood transfusion, infection, renal and vascular compromise, limb damage, arrhythmia, stroke, vessel dissection/perforation, Myocardial infarction, emergent CABG. Informed consent obtained and in chart.

## 2025-02-14 NOTE — H&P ADULT - NSHPLABSRESULTS_GEN_ALL_CORE
Labs per outpatient records 2/5/25:     H/H 13.2/40.5  WBC 9.4  Plt 253    Na 141  K 4.6  BUN 15  Creatinine 0.56  eGFR 89    Pending labs from today    EKG: NSR, HR 89

## 2025-02-14 NOTE — H&P ADULT - NSICDXPASTMEDICALHX_GEN_ALL_CORE_FT
PAST MEDICAL HISTORY:  Anxiety     Hemorrhoids     HF (heart failure)     HLD (hyperlipidemia)     HTN (hypertension)     Macular degeneration     Thyroid cancer

## 2025-02-18 ENCOUNTER — OUTPATIENT (OUTPATIENT)
Dept: OUTPATIENT SERVICES | Facility: HOSPITAL | Age: 85
LOS: 1 days | Discharge: HOME | End: 2025-02-18
Payer: MEDICARE

## 2025-02-18 DIAGNOSIS — Z90.710 ACQUIRED ABSENCE OF BOTH CERVIX AND UTERUS: Chronic | ICD-10-CM

## 2025-02-18 DIAGNOSIS — Z98.890 OTHER SPECIFIED POSTPROCEDURAL STATES: Chronic | ICD-10-CM

## 2025-02-18 LAB
A1C WITH ESTIMATED AVERAGE GLUCOSE RESULT: 5.1 % — SIGNIFICANT CHANGE UP (ref 4–5.6)
ALBUMIN SERPL ELPH-MCNC: 3.9 G/DL — SIGNIFICANT CHANGE UP (ref 3.3–5)
ALP SERPL-CCNC: 74 U/L — SIGNIFICANT CHANGE UP (ref 40–120)
ALT FLD-CCNC: 12 U/L — SIGNIFICANT CHANGE UP (ref 10–45)
ANION GAP SERPL CALC-SCNC: 12 MMOL/L — SIGNIFICANT CHANGE UP (ref 5–17)
APTT BLD: 30.4 SEC — SIGNIFICANT CHANGE UP (ref 24.5–35.6)
AST SERPL-CCNC: 13 U/L — SIGNIFICANT CHANGE UP (ref 10–40)
BASOPHILS # BLD AUTO: 0.04 K/UL — SIGNIFICANT CHANGE UP (ref 0–0.2)
BASOPHILS NFR BLD AUTO: 0.5 % — SIGNIFICANT CHANGE UP (ref 0–2)
BILIRUB SERPL-MCNC: 0.5 MG/DL — SIGNIFICANT CHANGE UP (ref 0.2–1.2)
BUN SERPL-MCNC: 12 MG/DL — SIGNIFICANT CHANGE UP (ref 7–23)
CALCIUM SERPL-MCNC: 8.7 MG/DL — SIGNIFICANT CHANGE UP (ref 8.4–10.5)
CHLORIDE SERPL-SCNC: 104 MMOL/L — SIGNIFICANT CHANGE UP (ref 96–108)
CHOLEST SERPL-MCNC: 195 MG/DL — SIGNIFICANT CHANGE UP
CK MB CFR SERPL CALC: 2.5 NG/ML — SIGNIFICANT CHANGE UP (ref 0–6.7)
CK SERPL-CCNC: 35 U/L — SIGNIFICANT CHANGE UP (ref 25–170)
CO2 SERPL-SCNC: 24 MMOL/L — SIGNIFICANT CHANGE UP (ref 22–31)
CREAT SERPL-MCNC: 0.55 MG/DL — SIGNIFICANT CHANGE UP (ref 0.5–1.3)
EGFR: 89 ML/MIN/1.73M2 — SIGNIFICANT CHANGE UP
EOSINOPHIL # BLD AUTO: 0.06 K/UL — SIGNIFICANT CHANGE UP (ref 0–0.5)
EOSINOPHIL NFR BLD AUTO: 0.7 % — SIGNIFICANT CHANGE UP (ref 0–6)
ESTIMATED AVERAGE GLUCOSE: 100 MG/DL — SIGNIFICANT CHANGE UP (ref 68–114)
GLUCOSE SERPL-MCNC: 101 MG/DL — HIGH (ref 70–99)
HCT VFR BLD CALC: 39.2 % — SIGNIFICANT CHANGE UP (ref 34.5–45)
HDLC SERPL-MCNC: 58 MG/DL — SIGNIFICANT CHANGE UP
HGB BLD-MCNC: 12.5 G/DL — SIGNIFICANT CHANGE UP (ref 11.5–15.5)
IMM GRANULOCYTES NFR BLD AUTO: 0.3 % — SIGNIFICANT CHANGE UP (ref 0–0.9)
INR BLD: 0.96 — SIGNIFICANT CHANGE UP (ref 0.85–1.16)
LIPID PNL WITH DIRECT LDL SERPL: 122 MG/DL — HIGH
LYMPHOCYTES # BLD AUTO: 2.05 K/UL — SIGNIFICANT CHANGE UP (ref 1–3.3)
LYMPHOCYTES # BLD AUTO: 23.7 % — SIGNIFICANT CHANGE UP (ref 13–44)
MAGNESIUM SERPL-MCNC: 2 MG/DL — SIGNIFICANT CHANGE UP (ref 1.6–2.6)
MCHC RBC-ENTMCNC: 28.6 PG — SIGNIFICANT CHANGE UP (ref 27–34)
MCHC RBC-ENTMCNC: 31.9 G/DL — LOW (ref 32–36)
MCV RBC AUTO: 89.7 FL — SIGNIFICANT CHANGE UP (ref 80–100)
MONOCYTES # BLD AUTO: 0.55 K/UL — SIGNIFICANT CHANGE UP (ref 0–0.9)
MONOCYTES NFR BLD AUTO: 6.4 % — SIGNIFICANT CHANGE UP (ref 2–14)
NEUTROPHILS # BLD AUTO: 5.93 K/UL — SIGNIFICANT CHANGE UP (ref 1.8–7.4)
NEUTROPHILS NFR BLD AUTO: 68.4 % — SIGNIFICANT CHANGE UP (ref 43–77)
NON HDL CHOLESTEROL: 137 MG/DL — HIGH
NRBC BLD AUTO-RTO: 0 /100 WBCS — SIGNIFICANT CHANGE UP (ref 0–0)
PLATELET # BLD AUTO: 192 K/UL — SIGNIFICANT CHANGE UP (ref 150–400)
POTASSIUM SERPL-MCNC: 3.7 MMOL/L — SIGNIFICANT CHANGE UP (ref 3.5–5.3)
POTASSIUM SERPL-SCNC: 3.7 MMOL/L — SIGNIFICANT CHANGE UP (ref 3.5–5.3)
PROT SERPL-MCNC: 6.5 G/DL — SIGNIFICANT CHANGE UP (ref 6–8.3)
PROTHROM AB SERPL-ACNC: 11.2 SEC — SIGNIFICANT CHANGE UP (ref 9.9–13.4)
RBC # BLD: 4.37 M/UL — SIGNIFICANT CHANGE UP (ref 3.8–5.2)
RBC # FLD: 14.2 % — SIGNIFICANT CHANGE UP (ref 10.3–14.5)
SODIUM SERPL-SCNC: 140 MMOL/L — SIGNIFICANT CHANGE UP (ref 135–145)
TRIGL SERPL-MCNC: 83 MG/DL — SIGNIFICANT CHANGE UP
WBC # BLD: 8.66 K/UL — SIGNIFICANT CHANGE UP (ref 3.8–10.5)
WBC # FLD AUTO: 8.66 K/UL — SIGNIFICANT CHANGE UP (ref 3.8–10.5)

## 2025-02-18 PROCEDURE — 82550 ASSAY OF CK (CPK): CPT

## 2025-02-18 PROCEDURE — C1769: CPT

## 2025-02-18 PROCEDURE — 93454 CORONARY ARTERY ANGIO S&I: CPT | Mod: 26

## 2025-02-18 PROCEDURE — 71275 CT ANGIOGRAPHY CHEST: CPT | Mod: 26

## 2025-02-18 PROCEDURE — C1887: CPT

## 2025-02-18 PROCEDURE — 74174 CTA ABD&PLVS W/CONTRAST: CPT | Mod: MC

## 2025-02-18 PROCEDURE — 82565 ASSAY OF CREATININE: CPT

## 2025-02-18 PROCEDURE — 85730 THROMBOPLASTIN TIME PARTIAL: CPT

## 2025-02-18 PROCEDURE — 36415 COLL VENOUS BLD VENIPUNCTURE: CPT

## 2025-02-18 PROCEDURE — 93454 CORONARY ARTERY ANGIO S&I: CPT

## 2025-02-18 PROCEDURE — 83036 HEMOGLOBIN GLYCOSYLATED A1C: CPT

## 2025-02-18 PROCEDURE — 93005 ELECTROCARDIOGRAM TRACING: CPT

## 2025-02-18 PROCEDURE — 85025 COMPLETE CBC W/AUTO DIFF WBC: CPT

## 2025-02-18 PROCEDURE — C1894: CPT

## 2025-02-18 PROCEDURE — 75572 CT HRT W/3D IMAGE: CPT | Mod: MC

## 2025-02-18 PROCEDURE — 80061 LIPID PANEL: CPT

## 2025-02-18 PROCEDURE — 82553 CREATINE MB FRACTION: CPT

## 2025-02-18 PROCEDURE — 71275 CT ANGIOGRAPHY CHEST: CPT | Mod: MC

## 2025-02-18 PROCEDURE — 80053 COMPREHEN METABOLIC PANEL: CPT

## 2025-02-18 PROCEDURE — 85610 PROTHROMBIN TIME: CPT

## 2025-02-18 PROCEDURE — 99152 MOD SED SAME PHYS/QHP 5/>YRS: CPT

## 2025-02-18 PROCEDURE — 83735 ASSAY OF MAGNESIUM: CPT

## 2025-02-18 PROCEDURE — 93010 ELECTROCARDIOGRAM REPORT: CPT

## 2025-02-18 PROCEDURE — 75572 CT HRT W/3D IMAGE: CPT | Mod: 26

## 2025-02-18 PROCEDURE — 74174 CTA ABD&PLVS W/CONTRAST: CPT | Mod: 26

## 2025-02-18 RX ORDER — MELOXICAM 15 MG/1
1 TABLET ORAL
Refills: 0 | DISCHARGE

## 2025-02-18 RX ADMIN — Medication 75 MILLILITER(S): at 17:08

## 2025-02-18 NOTE — PROGRESS NOTE ADULT - SUBJECTIVE AND OBJECTIVE BOX
Interventional Cardiology PA SDA Discharge Note    Patient without complaints. Ambulated and voided without difficulties    Afebrile, VSS    PRESCRIPTIONS/HOME MEDICATIONS:  ALPRAZolam 0.25 mg oral tablet: 1 tab(s) orally twice as needed for  anxiety  atenolol 25 mg oral tablet: 1 tab(s) orally twice  esomeprazole 20 mg oral delayed release capsule: 1 cap(s) orally once a day  Neurontin 100 mg oral capsule: 1 cap(s) orally once a day  simvastatin 40 mg oral tablet: 1 tab(s) orally once a day    Ext:    	Right Radial: no hematoma, no bleeding, dressing c/d/i     Pulses:    intact RAD to baseline     A/P:  84 y o f with PMH of anxiety disorder, macular degeneration, ocular migraine, ataxia, post herpetic neuralgia, thyroid CA s/p partial thyroidectomy, hemorrhoids, HTN, HLD, HFpEF, LFLG severe AS presented to his cardiologist for consultation of treatment options for AS reporting occasional chest tightness, WHITE, and LE edema, who now presents to [facility] for recommended cardiac cath for possible TAVR work-up in light of pt's risk factors, CCS 3-4 anginal symptoms and abnormal ECHO.     s/p ___    1.	  Follow-up with PMD/Cardiologist ___________ in 72 hours.  2.       Post procedure labs/EKG reviewed and stable.    3.       Pt given instructions on importance of taking antiplatelet medication(s).    4. 	  Stable for discharge as per attending . _________ after bed rest, pt voids, groin/wrist stable and 30 minutes of ambulation.  5.       Prescriptions for Aspirin/Plavix/Brilinta/Effient e-prescribed and submitted to patient's pharmacy Yes/No_______.  No Aspirin/Plavix/Brilinta/Effient prescribed due to ____________.  6.       Patient will continue/Start Statin (Name and dose).  No Statin Prescribed due to ____________.  7.       Patient will continue All Other Home Medications.  (PLEASE NOTE IF ANY CHANGES).  8.       Is patient a Current Smoker: Yes/No?  If yes, Patient was Counseled on importance of smoking cessation. Yes  9.	             *Education on benefits of Cardiac Rehab provided to patient: Yes         *Referral and Prescription Given for Cardiac Rehab: Yes/No.  If No, Why Not?  (see reasons below)         *Pt given list of locations & instructed to contact their insurance company to review list of participating providers. Yes          *Pt instructed to bring Cardiac Rehab prescription with them to Cardiology Follow up appointment for assistance with enrollment: Yes         *Pt discharge copies detail cardiovascular history, medications, testing/treatments OR pt has created a patient portal account and instructed to provider their records at their 1st appointment: Yes    *** Reasons for No Cardiac Rehab Referral Rx (Document 1 or more options):                Patient Refused            Medical Reason: ex has Home Care, Home PT, incomplete revascularization            Patient lacks medical coverage for Cardiac Rehab            Pt discharged to Nursing Care/[facility]/Long term Care Facility            Patient Lacks Transportation or no cardiac rehab within 60 minutes driving range            Patient already participates in Cardiac Rehab            Other: (provide details) ex: Hospice patient    10. CVD ( (STEMI/NSTEMI/ACS/UA &/OR Post PCI this admission): GLP-1 receptor agonist, SGLT2 inhibitor meds discussed w/ patient and encouraged to discuss further with PMD or endo at next visit: Yes  11. Discharge forms signed and copies in chart      Interventional Cardiology PA SDA Discharge Note    Patient without complaints. Ambulated and voided without difficulties    Afebrile, VSS    PRESCRIPTIONS/HOME MEDICATIONS:  ALPRAZolam 0.25 mg oral tablet: 1 tab(s) orally twice as needed for  anxiety  atenolol 25 mg oral tablet: 1 tab(s) orally twice  esomeprazole 20 mg oral delayed release capsule: 1 cap(s) orally once a day  Neurontin 100 mg oral capsule: 1 cap(s) orally once a day  simvastatin 40 mg oral tablet: 1 tab(s) orally once a day    Ext:    	Right Radial: no hematoma, no bleeding, dressing c/d/i     Pulses:    intact RAD to baseline     A/P:  83 y/o F with PMH of anxiety disorder, macular degeneration, ocular migraine, ataxia, post herpetic neuralgia, thyroid CA s/p partial thyroidectomy, hemorrhoids, HTN, HLD, HFpEF, LFLG severe AS presented to his cardiologist for consultation of treatment options for AS reporting occasional chest tightness, WHITE, and LE edema, who now presents to [facility] for recommended cardiac cath for possible TAVR work-up in light of pt's risk factors, CCS 3-4 anginal symptoms and abnormal ECHO.     s/p cardiac cath (2/18/25): LM normal, mLAD 50%, LCx 50%, RCA mild diffuse disease. Access: right radial.     - Follow-up with PMD/Cardiologist Dr. Dunbar in 72 hours.  - Pt given instructions on importance of taking antiplatelet medication(s).    - Stable for discharge as per attending Dr. Dunbar after bed rest, pt voids, groin/wrist stable and 30 minutes of ambulation.  - Prescriptions for Aspirin e-prescribed and submitted to patient's pharmacy Yes  - Patient will continue Statin Simvastatin 40 mg qhs.   - Patient will continue All Other Home Medications.   -  Is patient a Current Smoker: no  - CVD ( (STEMI/NSTEMI/ACS/UA &/OR Post PCI this admission): GLP-1 receptor agonist, SGLT2 inhibitor meds discussed w/ patient and encouraged to discuss further with PMD or endo at next visit: Yes  - Discharge forms signed and copies in chart      Interventional Cardiology PA SDA Discharge Note    Patient without complaints. Ambulated and voided without difficulties    Afebrile, VSS    PRESCRIPTIONS/HOME MEDICATIONS:  ALPRAZolam 0.25 mg oral tablet: 1 tab(s) orally twice as needed for  anxiety  atenolol 25 mg oral tablet: 1 tab(s) orally twice  esomeprazole 20 mg oral delayed release capsule: 1 cap(s) orally once a day  Neurontin 100 mg oral capsule: 1 cap(s) orally once a day  simvastatin 40 mg oral tablet: 1 tab(s) orally once a day    Ext:    	Right Radial: no hematoma, no bleeding, dressing c/d/i     Pulses:    intact RAD to baseline     A/P:  83 y/o F with PMH of anxiety disorder, macular degeneration, ocular migraine, ataxia, post herpetic neuralgia, thyroid CA s/p partial thyroidectomy, hemorrhoids, HTN, HLD, HFpEF, LFLG severe AS presented to his cardiologist for consultation of treatment options for AS reporting occasional chest tightness, WHITE, and LE edema, who now presents to [facility] for recommended cardiac cath for possible TAVR work-up in light of pt's risk factors, CCS 3-4 anginal symptoms and abnormal ECHO.     s/p cardiac cath (2/18/25): LM normal, mLAD 50%, LCx 50%, RCA mild diffuse disease. Access: right radial.     To proceed with TAVR per interventional.     - Follow-up with PMD/Cardiologist Dr. Dunbar in 72 hours.  - Pt given instructions on importance of taking antiplatelet medication(s).    - Stable for discharge as per attending Dr. Dunbar after bed rest, pt voids, groin/wrist stable and 30 minutes of ambulation.  - Prescriptions for Aspirin e-prescribed and submitted to patient's pharmacy Yes  - Patient will continue Statin Simvastatin 40 mg qhs.   - Patient will continue All Other Home Medications.   -  Is patient a Current Smoker: no  - CVD ( (STEMI/NSTEMI/ACS/UA &/OR Post PCI this admission): GLP-1 receptor agonist, SGLT2 inhibitor meds discussed w/ patient and encouraged to discuss further with PMD or endo at next visit: Yes  - Discharge forms signed and copies in chart

## 2025-02-19 PROBLEM — C73 MALIGNANT NEOPLASM OF THYROID GLAND: Chronic | Status: ACTIVE | Noted: 2025-02-14

## 2025-02-20 PROBLEM — I10 ESSENTIAL (PRIMARY) HYPERTENSION: Chronic | Status: ACTIVE | Noted: 2025-02-14

## 2025-02-20 PROBLEM — F41.9 ANXIETY DISORDER, UNSPECIFIED: Chronic | Status: ACTIVE | Noted: 2025-02-14

## 2025-02-20 PROBLEM — H35.30 UNSPECIFIED MACULAR DEGENERATION: Chronic | Status: ACTIVE | Noted: 2025-02-14

## 2025-02-20 PROBLEM — I50.9 HEART FAILURE, UNSPECIFIED: Chronic | Status: ACTIVE | Noted: 2025-02-14

## 2025-02-20 PROBLEM — E78.5 HYPERLIPIDEMIA, UNSPECIFIED: Chronic | Status: ACTIVE | Noted: 2025-02-14

## 2025-02-20 PROBLEM — K64.9 UNSPECIFIED HEMORRHOIDS: Chronic | Status: ACTIVE | Noted: 2025-02-14

## 2025-02-24 ENCOUNTER — APPOINTMENT (OUTPATIENT)
Dept: CARDIOTHORACIC SURGERY | Facility: CLINIC | Age: 85
End: 2025-02-24
Payer: MEDICARE

## 2025-02-24 PROCEDURE — 99214 OFFICE O/P EST MOD 30 MIN: CPT | Mod: 2W

## 2025-02-24 PROCEDURE — 99204 OFFICE O/P NEW MOD 45 MIN: CPT | Mod: 2W

## 2025-02-25 ENCOUNTER — TRANSCRIPTION ENCOUNTER (OUTPATIENT)
Age: 85
End: 2025-02-25

## 2025-02-28 ENCOUNTER — NON-APPOINTMENT (OUTPATIENT)
Age: 85
End: 2025-02-28

## 2025-03-03 ENCOUNTER — NON-APPOINTMENT (OUTPATIENT)
Age: 85
End: 2025-03-03

## 2025-03-03 VITALS
OXYGEN SATURATION: 96 % | DIASTOLIC BLOOD PRESSURE: 64 MMHG | TEMPERATURE: 98 F | HEART RATE: 70 BPM | HEIGHT: 67 IN | RESPIRATION RATE: 17 BRPM | WEIGHT: 184.97 LBS | SYSTOLIC BLOOD PRESSURE: 140 MMHG

## 2025-03-03 DIAGNOSIS — I35.0 NONRHEUMATIC AORTIC (VALVE) STENOSIS: ICD-10-CM

## 2025-03-03 DIAGNOSIS — I25.10 ATHEROSCLEROTIC HEART DISEASE OF NATIVE CORONARY ARTERY WITHOUT ANGINA PECTORIS: ICD-10-CM

## 2025-03-03 NOTE — PATIENT PROFILE ADULT - DEAF OR HARD OF HEARING?
Loren May MD [facility] History and Physical     Referring Provider: Loren May MD    Patient Care Team:  Bk Gleason MD as PCP - General  Bk Gleason MD as PCP - Family Medicine    Chief complaint abdominal pain    Subjective .     History of present illness:  The patient is a 50 y.o. male who presents complaining of acute onset suprapubic pain with associated nausea and bloating.  He denies complain of increased constipation and pain after urination.  He denies any BRBPR, melena, hematochezia, dysuria, pneumaturia, or fecaluria.  He does have a history of recurrent sigmoid diverticulitis. He has an episode approximately every two years.    Review of Systems    Review of Systems - General ROS: negative  ENT ROS: negative  Respiratory ROS: no cough, shortness of breath, or wheezing  Cardiovascular ROS: no chest pain or dyspnea on exertion  Gastrointestinal ROS: no abdominal pain, change in bowel habits, or black or bloody stools  Genito-Urinary ROS: no dysuria, trouble voiding, or hematuria  Dermatological ROS: negative   Breast ROS: negative for breast lumps  Hematological and Lymphatic ROS: negative  Musculoskeletal ROS: negative   Neurological ROS: no TIA or stroke symptoms    Psychological ROS: negative  Endocrine ROS: negative    History  Past Medical History:   Diagnosis Date   • Colon polyp    • Diverticulitis    • Hyperlipidemia    • Hypertension    ,   Past Surgical History:   Procedure Laterality Date   • COLONOSCOPY  01/31/2013   • VASECTOMY     ,   Family History   Problem Relation Age of Onset   • Heart disease Father    • Colon cancer Neg Hx    • Colon polyps Neg Hx    ,   Social History     Tobacco Use   • Smoking status: Light Tobacco Smoker     Types: Cigars   • Smokeless tobacco: Never Used   Substance Use Topics   • Alcohol use: Yes     Comment: occ   • Drug use: No   ,   Medications Prior to Admission   Medication Sig Dispense Refill Last Dose   • amlodipine-olmesartan  (GAMAL) 5-40 MG per tablet Take 1 tablet by mouth Daily.   12/5/2018 at Unknown time   • atorvastatin (LIPITOR) 20 MG tablet Take 20 mg by mouth Daily.   12/5/2018 at Unknown time   • metFORMIN (GLUCOPHAGE) 500 MG tablet Take 500 mg by mouth 2 (Two) Times a Day With Meals.   12/5/2018 at Unknown time    and Allergies:  Patient has no known allergies.    Current Facility-Administered Medications:   •  [START ON 12/7/2018] enoxaparin (LOVENOX) syringe 40 mg, 40 mg, Subcutaneous, Daily, Loren May MD  •  famotidine (PEPCID) injection 20 mg, 20 mg, Intravenous, Q12H, Loren May MD, 20 mg at 12/06/18 1229  •  HYDROmorphone (DILAUDID) injection 0.5 mg, 0.5 mg, Intravenous, Q4H PRN, Loren May MD, 0.5 mg at 12/06/18 1659  •  ketorolac (TORADOL) injection 30 mg, 30 mg, Intravenous, Q6H PRN, Loren May MD  •  lactated ringers infusion, 125 mL/hr, Intravenous, Continuous, Loren May MD, Last Rate: 125 mL/hr at 12/06/18 1229, 125 mL/hr at 12/06/18 1229  •  ondansetron (ZOFRAN) injection 4 mg, 4 mg, Intravenous, Q4H PRN, Loren May MD  •  piperacillin-tazobactam (ZOSYN) 3.375 g in iso-osmotic dextrose 50 ml (premix), 3.375 g, Intravenous, Q6H, Loren May MD, Stopped at 12/06/18 1551    Objective     Vital Signs   Temp:  [98 °F (36.7 °C)-100.3 °F (37.9 °C)] 98.8 °F (37.1 °C)  Heart Rate:  [64-96] 64  Resp:  [16-18] 16  BP: ()/(47-84) 88/47    Physical Exam:  General appearance - alert, well appearing, and in no distress  Mental status - alert, oriented to person, place, and time  Neck - supple, no significant adenopathy  Chest - clear to auscultation, no wheezes, rales or rhonchi, symmetric air entry  Heart - normal rate, regular rhythm, normal S1, S2, no murmurs, rubs, clicks or gallops  Abdomen - soft, point tenderness suprapubic region, no masses  Neurological - alert, oriented, normal speech, no focal findings or movement disorder noted  Musculoskeletal - no joint  tenderness, deformity or swelling  Extremities - peripheral pulses normal, no pedal edema, no clubbing or cyanosis    Results Review:     Lab Results (last 24 hours)     Procedure Component Value Units Date/Time    Urinalysis With Microscopic If Indicated (No Culture) - Urine, Clean Catch [[id number]]  (Normal) Collected:  12/06/18 0736    Specimen:  Urine, Clean Catch Updated:  12/06/18 0744     Color, UA Yellow     Appearance, UA Clear     pH, UA 5.5     Specific Gravity, UA 1.011     Glucose, UA Negative     Ketones, UA Negative     Bilirubin, UA Negative     Blood, UA Negative     Protein, UA Negative     Leuk Esterase, UA Negative     Nitrite, UA Negative     Urobilinogen, UA 0.2 E.U./dL    Narrative:       Urine microscopic not indicated.    Blood Culture - Blood, Arm, Right [[id number]] Collected:  12/06/18 0620    Specimen:  Blood from Arm, Right Updated:  12/06/18 0705    Blood Culture - Blood, Arm, Right [[id number]] Collected:  12/06/18 0625    Specimen:  Blood from Arm, Right Updated:  12/06/18 0704    Lactic Acid, Plasma [[id number]]  (Normal) Collected:  12/06/18 0557    Specimen:  Blood Updated:  12/06/18 0701     Lactate 1.3 mmol/L     [place] Draw [[id number]] Collected:  12/06/18 0557    Specimen:  Blood Updated:  12/06/18 0700    Narrative:       The following orders were created for panel order [place] Draw.  Procedure                               Abnormality         Status                     ---------                               -----------         ------                     Light Blue Top[[id number]]                                   Final result               Green Top (Gel)[[id number]]                                  Final result               Lavender Top[[id number]]                                     Final result               Red Top[[id number]]                                          Final result                 Please view results for these tests on the individual orders.    Light Blue Top  [[id number]] Collected:  12/06/18 0557    Specimen:  Blood Updated:  12/06/18 0700     Extra Tube hold for add-on     Comment: Auto resulted       Green Top (Gel) [[id number]] Collected:  12/06/18 0557    Specimen:  Blood Updated:  12/06/18 0700     Extra Tube Hold for add-ons.     Comment: Auto resulted.       Lavender Top [[id number]] Collected:  12/06/18 0557    Specimen:  Blood Updated:  12/06/18 0700     Extra Tube hold for add-on     Comment: Auto resulted       Red Top [[id number]] Collected:  12/06/18 0557    Specimen:  Blood Updated:  12/06/18 0700     Extra Tube Hold for add-ons.     Comment: Auto resulted.       Basic Metabolic Panel [[id number]]  (Abnormal) Collected:  12/06/18 0557    Specimen:  Blood Updated:  12/06/18 0624     Glucose 119 mg/dL      BUN 20 mg/dL      Creatinine 1.01 mg/dL      Sodium 140 mmol/L      Potassium 4.1 mmol/L      Chloride 103 mmol/L      CO2 20.0 mmol/L      Calcium 9.7 mg/dL      eGFR Non African Amer 78 mL/min/1.73      BUN/Creatinine Ratio 19.8     Anion Gap 17.0 mmol/L     Narrative:       GFR Normal >60  Chronic Kidney Disease <60  Kidney Failure <15    CBC & Differential [[id number]] Collected:  12/06/18 0557    Specimen:  Blood Updated:  12/06/18 0615    Narrative:       The following orders were created for panel order CBC & Differential.  Procedure                               Abnormality         Status                     ---------                               -----------         ------                     CBC Auto Differential[[id number]]        Abnormal            Final result                 Please view results for these tests on the individual orders.    CBC Auto Differential [[id number]]  (Abnormal) Collected:  12/06/18 0557    Specimen:  Blood Updated:  12/06/18 0615     WBC 13.32 10*3/mm3      RBC 4.99 10*6/mm3      Hemoglobin 14.5 g/dL      Hematocrit 40.8 %      MCV 81.8 fL      MCH 29.1 pg      MCHC 35.5 g/dL      RDW 12.5 %      RDW-SD 37.1 fl      MPV 11.4  fL      Platelets 211 10*3/mm3      Neutrophil % 78.9 %      Lymphocyte % 8.7 %      Monocyte % 10.0 %      Eosinophil % 1.5 %      Basophil % 0.4 %      Immature Grans % 0.5 %      Neutrophils, Absolute 10.52 10*3/mm3      Lymphocytes, Absolute 1.16 10*3/mm3      Monocytes, Absolute 1.33 10*3/mm3      Eosinophils, Absolute 0.20 10*3/mm3      Basophils, Absolute 0.05 10*3/mm3      Immature Grans, Absolute 0.06 10*3/mm3      nRBC 0.0 /100 WBC         Imaging Results (last 24 hours)     Procedure Component Value Units Date/Time    CT Abdomen Pelvis With Contrast [[id number]] Collected:  12/06/18 0839     Updated:  12/06/18 0901    Narrative:       CT ABDOMEN PELVIS W CONTRAST- 12/6/2018 8:21 AM CST     HISTORY: llq pain, history of per diverticular disease       COMPARISON: 1/3/2016.      DLP: 453 mGy cm. Automated exposure control was utilized to diminish  patient radiation dose.     TECHNIQUE: Following the oral ingestion and intravenous administration  of contrast, helical CT tomographic images of the abdomen and pelvis  were acquired. Coronal reformatted images were also provided for review.        FINDINGS:   There is dependent atelectasis within the posterior lung bases. Lung  bases are otherwise clear. The base of the heart is unremarkable..      LIVER: No focal liver lesion. The hepatic vasculature is patent. There  is mild steatosis of the liver.     BILIARY SYSTEM: The gallbladder is unremarkable. No intrahepatic or  extrahepatic ductal dilatation.      PANCREAS: No focal pancreatic lesion.      SPLEEN: Unremarkable.      KIDNEYS AND ADRENALS: Bilateral kidneys and adrenal glands are  unremarkable. The ureters are decompressed and normal in appearance.     RETROPERITONEUM: No mass, lymphadenopathy or hemorrhage.      GI TRACT: Diverticulosis is noted of the descending and sigmoid colon.  There is extensive diverticulitis within the mid sigmoid colon with  extensive inflammatory changes surrounding this  segment of colon and  diffuse thickening of this segment of the colon. Inflammatory changes  extend inferiorly almost to the level of the dome of the bladder as well  as several centimeters above the involved segment of colon. I do not see  evidence of pneumoperitoneum. No evidence of diverticular abscess.. The  appendix is visualized and unremarkable.     OTHER: There is no additional mesenteric mass, lymphadenopathy or fluid  collection. The abdominopelvic vasculature is patent. The osseous  structures and soft tissues demonstrate no worrisome lesions. A  fat-containing right inguinal hernia is present.      PELVIS: No mass lesion, fluid collection or significant lymphadenopathy  is seen in the pelvis. The urinary bladder is normal in appearance.       Impression:       1. Extensive diverticulitis involving the mid sigmoid colon with  extensive inflammatory stranding and thickening of a short segment of  colon. A colonic neoplasm has to be considered in the differential but  is considered unlikely given that the patient has suffered  diverticulitis in the same segment of colon on previous occasions. I do  not see evidence of pneumoperitoneum. No evidence of diverticular  abscess. Inflammatory stranding extends for several centimeters both  above and below this segment of colon.  2. Mild steatosis of the liver.  3. Mild bibasilar atelectasis.  4. Fat-containing right inguinal hernia.        This report was finalized on 12/06/2018 08:58 by Dr. Urban Wills MD.            Assessment/Plan       Acute sigmoid diverticulitis.  He will be admitted, hydrated, and iv abx will be administered.  Serial examinations will be performed.      Loren May MD  12/06/18  5:54 PM             no

## 2025-03-03 NOTE — PRE-OP CHECKLIST - MUPIRONCIN COMMENTS
Chief Complaint:  hypothermia/UTI    HPI/ OVERNIGHT EVENTS:   Pt is seen and examined at bedside by PA student and physician.  He is alert & oriented and pleasant and cooperative on exam.  Pt states he is "feeling well."   Denies fever, chills, n/v/d, abdominal pain, dysuria, SOB, CP, palpitations, HA, dizziness, or lightheadedness. All other ROS are negative.    MEDICATIONS  (STANDING):  allopurinol 100 milliGRAM(s) Oral daily  atorvastatin 20 milliGRAM(s) Oral at bedtime  dextrose 5%. 1000 milliLiter(s) (50 mL/Hr) IV Continuous <Continuous>  dextrose 50% Injectable 12.5 Gram(s) IV Push once  dextrose 50% Injectable 25 Gram(s) IV Push once  dextrose 50% Injectable 25 Gram(s) IV Push once  epoetin liam-epbx (RETACRIT) Injectable 24947 Unit(s) SubCutaneous every 7 days  ferrous    sulfate 325 milliGRAM(s) Oral daily  finasteride 5 milliGRAM(s) Oral daily  heparin   Injectable 5000 Unit(s) SubCutaneous every 12 hours  insulin lispro (ADMELOG) corrective regimen sliding scale   SubCutaneous three times a day before meals  insulin lispro (ADMELOG) corrective regimen sliding scale   SubCutaneous at bedtime  lactobacillus acidophilus 1 Tablet(s) Oral three times a day with meals  meropenem  IVPB 1000 milliGRAM(s) IV Intermittent every 12 hours  midodrine. 10 milliGRAM(s) Oral three times a day  multivitamin 1 Tablet(s) Oral daily  oxybutynin 10 milliGRAM(s) Oral daily  tamsulosin 0.4 milliGRAM(s) Oral at bedtime    MEDICATIONS  (PRN):  acetaminophen  Tablet 650 milliGRAM(s) Oral every 6 hours PRN Temp greater or equal to 38C (100.4F)  calamine/zinc oxide Lotion 1 Application(s) Topical two times a day PRN Itching  dextrose 40% Gel 15 Gram(s) Oral once PRN Blood Glucose LESS THAN 70 milliGRAM(s)/deciliter  glucagon  Injectable 1 milliGRAM(s) IntraMuscular once PRN Glucose LESS THAN 70 milligrams/deciliter      PHYSICAL EXAM:  Vital Signs Last 24 Hrs  T(C): 36.4 (30 Oct 2020 08:41), Max: 36.4 (30 Oct 2020 08:41)  T(F): 97.5 (30 Oct 2020 08:41), Max: 97.5 (30 Oct 2020 08:41)  HR: 76 (30 Oct 2020 08:41) (67 - 82)  BP: 101/65 (30 Oct 2020 08:41) (101/65 - 112/79)  RR: 19 (30 Oct 2020 08:41) (18 - 19)  SpO2: 96% (30 Oct 2020 08:41) (96% - 100%)    CONSTITUTIONAL: NAD, well-developed, well-groomed  NECK: No JVD, neck supple, trachea midline  RESPIRATORY: Normal respiratory effort; lungs are clear to auscultation b/l  CARDIOVASCULAR: RRR, normal S1 and S2, no murmur/rub/gallop;   PV: venous statis b/l, dressing over right and left foot, pulses palpable  ABDOMEN: Nontender to palpation, no rebound/guarding, normoactive bowel sounds,  MUSCULOSKELETAL:  Normal gait; no clubbing or cyanosis of digits; no joint swelling or tenderness to palpation  PSYCH: A&Ox2 mood and affect appropriate  NEUROLOGY: CN 2-12 are intact and symmetric; no gross sensory deficits  SKIN:       LABS:                                        7.5    5.14  )-----------( 115      ( 29 Oct 2020 07:57 )             25.0     10-29    138  |  103  |  45.0<H>  ----------------------------<  134<H>  4.4   |  22.0  |  2.34<H>    Ca    8.8      29 Oct 2020 07:57    LIVER FUNCTIONS - ( 27 Oct 2020 15:05 )  Alb: 2.9 g/dL / Pro: 6.4 g/dL / ALK PHOS: 112 U/L / ALT: 17 U/L / AST: 21 U/L / GGT: x           PT/INR - ( 27 Oct 2020 15:05 )   PT: 18.2 sec;   INR: 1.60 ratio      PTT - ( 27 Oct 2020 15:05 )  PTT:45.0 sec       PTT - ( 27 Oct 2020 15:05 )  PTT:45.0 sec  CARDIAC MARKERS ( 27 Oct 2020 15:05 )  x     / 0.05 ng/mL / x     / x     / x          Urinalysis Basic - ( 27 Oct 2020 15:41 )  Color: Yellow / Appearance: Clear / S.010 / pH: x  Gluc: x / Ketone: Negative  / Bili: Negative / Urobili: Negative mg/dL   Blood: x / Protein: 100 mg/dL / Nitrite: Negative   Leuk Esterase: Moderate / RBC: 11-25 /HPF / WBC >50   Sq Epi: x / Non Sq Epi: Occasional / Bacteria: x      CAPILLARY BLOOD GLUCOSE  POCT Blood Glucose.: 231 mg/dL (29 Oct 2020 10:40)  POCT Blood Glucose.: 140 mg/dL (29 Oct 2020 07:45)  POCT Blood Glucose.: 144 mg/dL (28 Oct 2020 21:28)  POCT Blood Glucose.: 166 mg/dL (28 Oct 2020 19:30)   Chief Complaint:  hypothermia/UTI    HPI/ OVERNIGHT EVENTS:   Pt is seen and examined at bedside by PA student and physician.  He is alert & oriented and pleasant and cooperative on exam.  Pt states he is "doing well." He states he has no complaints.   Denies fever, chills, n/v/d, abdominal pain, dysuria, SOB, CP, palpitations, cough, HA, dizziness, or lightheadedness. All other ROS are negative.    MEDICATIONS  (STANDING):  allopurinol 100 milliGRAM(s) Oral daily  atorvastatin 20 milliGRAM(s) Oral at bedtime  dextrose 5%. 1000 milliLiter(s) (50 mL/Hr) IV Continuous <Continuous>  dextrose 50% Injectable 12.5 Gram(s) IV Push once  dextrose 50% Injectable 25 Gram(s) IV Push once  dextrose 50% Injectable 25 Gram(s) IV Push once  epoetin liam-epbx (RETACRIT) Injectable 97011 Unit(s) SubCutaneous every 7 days  ferrous sulfate 325 milliGRAM(s) Oral daily  finasteride 5 milliGRAM(s) Oral daily  heparin Injectable 5000 Unit(s) SubCutaneous every 12 hours  insulin lispro (ADMELOG) corrective regimen sliding scale   SubCutaneous three times a day before meals  insulin lispro (ADMELOG) corrective regimen sliding scale   SubCutaneous at bedtime  lactobacillus acidophilus 1 Tablet(s) Oral three times a day with meals  meropenem  IVPB 1000 milliGRAM(s) IV Intermittent every 12 hours  midodrine. 10 milliGRAM(s) Oral three times a day  multivitamin 1 Tablet(s) Oral daily  oxybutynin 10 milliGRAM(s) Oral daily  tamsulosin 0.4 milliGRAM(s) Oral at bedtime    MEDICATIONS  (PRN):  acetaminophen  Tablet 650 milliGRAM(s) Oral every 6 hours PRN Temp greater or equal to 38C (100.4F)  calamine/zinc oxide Lotion 1 Application(s) Topical two times a day PRN Itching  dextrose 40% Gel 15 Gram(s) Oral once PRN Blood Glucose LESS THAN 70 milliGRAM(s)/deciliter  glucagon  Injectable 1 milliGRAM(s) IntraMuscular once PRN Glucose LESS THAN 70 milligrams/deciliter      PHYSICAL EXAM:  Vital Signs Last 24 Hrs  T(C): 36.4 (30 Oct 2020 08:41), Max: 36.4 (30 Oct 2020 08:41)  T(F): 97.5 (30 Oct 2020 08:41), Max: 97.5 (30 Oct 2020 08:41)  HR: 76 (30 Oct 2020 08:41) (67 - 82)  BP: 101/65 (30 Oct 2020 08:41) (101/65 - 112/79)  RR: 19 (30 Oct 2020 08:41) (18 - 19)  SpO2: 96% (30 Oct 2020 08:41) (96% - 100%)    CONSTITUTIONAL: NAD, well-developed, well-groomed  NECK: No JVD, neck supple, trachea midline  RESPIRATORY: Normal respiratory effort; lungs are clear to auscultation b/l, no ronchi, wheezes or rales  CARDIOVASCULAR: RRR, normal S1 and S2, no murmur/rub/gallop  PV: venous statis b/l, dressing over right and left foot, pulses palpable  ABDOMEN: Nontender to palpation, no rebound/guarding, normoactive bowel sounds, prominent abdomen  MUSCULOSKELETAL: Normal gait; no clubbing or cyanosis of digits; no joint swelling or tenderness to palpation  PSYCH: A&Ox2, mood and affect appropriate  NEUROLOGY: CN II-XII are intact and symmetric; no gross sensory deficits  SKIN: Erythematous scales over groin, no tenderness, skin warm and dry      LABS:                       7.2    5.22  )-----------( 118      ( 30 Oct 2020 08:10 )             23.8   10-30    137  |  103  |  48.0<H>  ----------------------------<  118<H>  4.9   |  20.0<L>  |  2.28<H>    Ca    8.6      30 Oct 2020 07:34      LIVER FUNCTIONS - ( 27 Oct 2020 15:05 )  Alb: 2.9 g/dL / Pro: 6.4 g/dL / ALK PHOS: 112 U/L / ALT: 17 U/L / AST: 21 U/L / GGT: x           PT/INR - ( 27 Oct 2020 15:05 )   PT: 18.2 sec;   INR: 1.60 ratio     PTT - ( 27 Oct 2020 15:05 )  PTT:45.0 sec  CARDIAC MARKERS ( 27 Oct 2020 15:05 )  x     / 0.05 ng/mL / x     / x     / x        Urinalysis Basic - ( 27 Oct 2020 15:41 )  Color: Yellow / Appearance: Clear / S.010 / pH: x  Gluc: x / Ketone: Negative  / Bili: Negative / Urobili: Negative mg/dL   Blood: x / Protein: 100 mg/dL / Nitrite: Negative   Leuk Esterase: Moderate / RBC: 11-25 /HPF / WBC >50   Sq Epi: x / Non Sq Epi: Occasional / Bacteria: x    CAPILLARY BLOOD GLUCOSE  POCT Blood Glucose.: 141 mg/dL (30 Oct 2020 08:10)  POCT Blood Glucose.: 168 mg/dL (29 Oct 2020 21:11)  POCT Blood Glucose.: 142 mg/dL (29 Oct 2020 16:17)        IMAGING:    < from: CT Abdomen and Pelvis No Cont (10.29.20 @ 17:44) >  FINDINGS:  CHEST:  LUNGS AND LARGE AIRWAYS: Patent central airways. Bilateral lower lobe compressive atelectasis. Correlate clinically to exclude superimposed pneumonia. Scattered ground glass opacities and interlobular septal thickening may represent mild pulmonary edema.  PLEURA: Mild to moderate right pleural effusion. Small left pleural effusion.  VESSELS: Atherosclerotic changes of the aorta and coronary vasculature. No aortic aneurysm.  HEART: Heart is enlarged. No pericardial effusion.  MEDIASTINUM AND SANA: Debris within the proximal mid esophagus  CHEST WALL AND LOWER NECK: Asymmetric left-sided gynecomastia unchanged.    ABDOMEN AND PELVIS:  LIVER: A few hepatic dome calcifications.  BILE DUCTS: Normal caliber.  GALLBLADDER: Gallbladder sludge.  SPLEEN: Calcified granulomas. Hypodense splenic lesion, indeterminate in nature however unchanged.  PANCREAS: Within normal limits.  ADRENALS: Within normal limits.  KIDNEYS/URETERS: Mild bilateral hydroureteronephrosis to the level the bladder without obstructive calculi or mass. Nonspecific bilateral perinephric stranding and perinephric fluid. Nonobstructive left intrarenal calculi.    BLADDER: Marked thickening of the anterior and lateral bladder wall, correlate with urinalysis and direct visualization.  REPRODUCTIVE ORGANS: Presacral edema. Subcentimeter pelvic lymph nodes.    BOWEL: Moderate amount of stool distending the rectum. Colonic diverticulosis without evidence of diverticulitis. No bowel obstruction. No bowel wall thickening. Appendix not visualized however no secondary signs of acute appendicitis.    PERITONEUM: Trace ascites. No free air.  VESSELS: Atherosclerotic changes of the aorta without aneurysmal dilatation.  RETROPERITONEUM/LYMPH NODES: No lymphadenopathy.  ABDOMINAL WALL: Extensive anasarca.  BONES: Anterior left shoulder dislocation. Left glenohumeral complex bursal fluid collection. Degenerative changes. Multilevel degenerative disc disease throughout the thoracic and lumbar spine.    IMPRESSION:    Mild to moderate bilateral pleural effusions with bilateral lower lobe compressive atelectasis, correlate clinically for superimposed pneumonia. Suspected mild pulmonary edema. Additional findings of third spacing.    Colonic diverticulosis without diverticulitis. No bowel obstruction or gross bowel wall thickening.    Bilateral hydroureteronephrosis to the level of the bladder without radiopaque obstructing mass or calculus with marked thickening of the anterior and right/left lateral bladder wall, correlate with urinalysis and direct visualization as indicated.     Anterior left shoulder dislocation with a left glenohumeral bursal fluid collection likely chronic in nature    < end of copied text >      < from: US Renal (10.29.20 @ 19:20) >  FINDINGS:    Right kidney: 10.5 (cm). Mild right-sided hydronephrosis. No renal mass. No calculi are identified..    Left kidney: 10.3 (cm). Mild left-sided hydronephrosis. No renal mass. No calculi identified on sonographic evaluation (CT demonstrated).    Urinary bladder: Irregular bladder wall thickening. Bilateral ureteral jets visualized.    IMPRESSION:    Bilateral mild hydronephrosis. Irregular bladder wall thickening. Further evaluation is recommended.    < end of copied text >       wipes nor washes ordered by physician

## 2025-03-03 NOTE — PATIENT PROFILE ADULT - FALL HARM RISK - HARM RISK INTERVENTIONS
Communicate Risk of Fall with Harm to all staff/Orthostatic vital signs/Reinforce activity limits and safety measures with patient and family/Tailored Fall Risk Interventions/Visual Cue: Yellow wristband and red socks/Bed in lowest position, wheels locked, appropriate side rails in place/Call bell, personal items and telephone in reach/Instruct patient to call for assistance before getting out of bed or chair/Non-slip footwear when patient is out of bed/[place] to call system/Physically safe environment - no spills, clutter or unnecessary equipment/Purposeful Proactive Rounding/Room/bathroom lighting operational, light cord in reach

## 2025-03-04 ENCOUNTER — TRANSCRIPTION ENCOUNTER (OUTPATIENT)
Age: 85
End: 2025-03-04

## 2025-03-04 ENCOUNTER — INPATIENT (INPATIENT)
Facility: HOSPITAL | Age: 85
LOS: 0 days | Discharge: HOME HEALTH | DRG: 267 | End: 2025-03-05
Attending: INTERNAL MEDICINE | Admitting: INTERNAL MEDICINE
Payer: MEDICARE

## 2025-03-04 ENCOUNTER — NON-APPOINTMENT (OUTPATIENT)
Age: 85
End: 2025-03-04

## 2025-03-04 ENCOUNTER — APPOINTMENT (OUTPATIENT)
Dept: CARDIOTHORACIC SURGERY | Facility: HOSPITAL | Age: 85
End: 2025-03-04

## 2025-03-04 ENCOUNTER — RESULT REVIEW (OUTPATIENT)
Age: 85
End: 2025-03-04

## 2025-03-04 DIAGNOSIS — Z98.890 OTHER SPECIFIED POSTPROCEDURAL STATES: Chronic | ICD-10-CM

## 2025-03-04 DIAGNOSIS — Z90.710 ACQUIRED ABSENCE OF BOTH CERVIX AND UTERUS: Chronic | ICD-10-CM

## 2025-03-04 LAB
ALBUMIN SERPL ELPH-MCNC: 3.8 G/DL — SIGNIFICANT CHANGE UP (ref 3.3–5)
ALBUMIN SERPL ELPH-MCNC: 4.1 G/DL — SIGNIFICANT CHANGE UP (ref 3.3–5)
ALP SERPL-CCNC: 69 U/L — SIGNIFICANT CHANGE UP (ref 40–120)
ALP SERPL-CCNC: 80 U/L — SIGNIFICANT CHANGE UP (ref 40–120)
ALT FLD-CCNC: 12 U/L — SIGNIFICANT CHANGE UP (ref 10–45)
ALT FLD-CCNC: 13 U/L — SIGNIFICANT CHANGE UP (ref 10–45)
ANION GAP SERPL CALC-SCNC: 10 MMOL/L — SIGNIFICANT CHANGE UP (ref 5–17)
ANION GAP SERPL CALC-SCNC: 12 MMOL/L — SIGNIFICANT CHANGE UP (ref 5–17)
APTT BLD: 30.7 SEC — SIGNIFICANT CHANGE UP (ref 24.5–35.6)
APTT BLD: 40.9 SEC — HIGH (ref 24.5–35.6)
APTT BLD: 52.4 SEC — HIGH (ref 24.5–35.6)
AST SERPL-CCNC: 13 U/L — SIGNIFICANT CHANGE UP (ref 10–40)
AST SERPL-CCNC: 15 U/L — SIGNIFICANT CHANGE UP (ref 10–40)
BASE EXCESS BLDA CALC-SCNC: 0 MMOL/L — SIGNIFICANT CHANGE UP (ref -2–3)
BASOPHILS # BLD AUTO: 0.03 K/UL — SIGNIFICANT CHANGE UP (ref 0–0.2)
BASOPHILS NFR BLD AUTO: 0.4 % — SIGNIFICANT CHANGE UP (ref 0–2)
BILIRUB SERPL-MCNC: 0.4 MG/DL — SIGNIFICANT CHANGE UP (ref 0.2–1.2)
BILIRUB SERPL-MCNC: 0.4 MG/DL — SIGNIFICANT CHANGE UP (ref 0.2–1.2)
BLD GP AB SCN SERPL QL: NEGATIVE — SIGNIFICANT CHANGE UP
BUN SERPL-MCNC: 15 MG/DL — SIGNIFICANT CHANGE UP (ref 7–23)
BUN SERPL-MCNC: 15 MG/DL — SIGNIFICANT CHANGE UP (ref 7–23)
CALCIUM SERPL-MCNC: 8.2 MG/DL — LOW (ref 8.4–10.5)
CALCIUM SERPL-MCNC: 9 MG/DL — SIGNIFICANT CHANGE UP (ref 8.4–10.5)
CHLORIDE SERPL-SCNC: 104 MMOL/L — SIGNIFICANT CHANGE UP (ref 96–108)
CHLORIDE SERPL-SCNC: 109 MMOL/L — HIGH (ref 96–108)
CO2 BLDA-SCNC: 27 MMOL/L — HIGH (ref 19–24)
CO2 SERPL-SCNC: 26 MMOL/L — SIGNIFICANT CHANGE UP (ref 22–31)
CO2 SERPL-SCNC: 28 MMOL/L — SIGNIFICANT CHANGE UP (ref 22–31)
CREAT SERPL-MCNC: 0.5 MG/DL — SIGNIFICANT CHANGE UP (ref 0.5–1.3)
CREAT SERPL-MCNC: 0.57 MG/DL — SIGNIFICANT CHANGE UP (ref 0.5–1.3)
EGFR: 88 ML/MIN/1.73M2 — SIGNIFICANT CHANGE UP
EGFR: 88 ML/MIN/1.73M2 — SIGNIFICANT CHANGE UP
EGFR: 91 ML/MIN/1.73M2 — SIGNIFICANT CHANGE UP
EGFR: 91 ML/MIN/1.73M2 — SIGNIFICANT CHANGE UP
EOSINOPHIL # BLD AUTO: 0.12 K/UL — SIGNIFICANT CHANGE UP (ref 0–0.5)
EOSINOPHIL NFR BLD AUTO: 1.6 % — SIGNIFICANT CHANGE UP (ref 0–6)
GLUCOSE SERPL-MCNC: 105 MG/DL — HIGH (ref 70–99)
GLUCOSE SERPL-MCNC: 114 MG/DL — HIGH (ref 70–99)
HCO3 BLDA-SCNC: 25 MMOL/L — SIGNIFICANT CHANGE UP (ref 21–28)
HCT VFR BLD CALC: 35.3 % — SIGNIFICANT CHANGE UP (ref 34.5–45)
HCT VFR BLD CALC: 41.5 % — SIGNIFICANT CHANGE UP (ref 34.5–45)
HGB BLD-MCNC: 11.6 G/DL — SIGNIFICANT CHANGE UP (ref 11.5–15.5)
HGB BLD-MCNC: 13.3 G/DL — SIGNIFICANT CHANGE UP (ref 11.5–15.5)
IMM GRANULOCYTES NFR BLD AUTO: 0.5 % — SIGNIFICANT CHANGE UP (ref 0–0.9)
INR BLD: 0.96 — SIGNIFICANT CHANGE UP (ref 0.85–1.16)
INR BLD: 1.07 — SIGNIFICANT CHANGE UP (ref 0.85–1.16)
LYMPHOCYTES # BLD AUTO: 1.25 K/UL — SIGNIFICANT CHANGE UP (ref 1–3.3)
LYMPHOCYTES # BLD AUTO: 16.6 % — SIGNIFICANT CHANGE UP (ref 13–44)
MAGNESIUM SERPL-MCNC: 2 MG/DL — SIGNIFICANT CHANGE UP (ref 1.6–2.6)
MCHC RBC-ENTMCNC: 29.6 PG — SIGNIFICANT CHANGE UP (ref 27–34)
MCHC RBC-ENTMCNC: 29.7 PG — SIGNIFICANT CHANGE UP (ref 27–34)
MCHC RBC-ENTMCNC: 32 G/DL — SIGNIFICANT CHANGE UP (ref 32–36)
MCHC RBC-ENTMCNC: 32.9 G/DL — SIGNIFICANT CHANGE UP (ref 32–36)
MCV RBC AUTO: 90.1 FL — SIGNIFICANT CHANGE UP (ref 80–100)
MCV RBC AUTO: 92.6 FL — SIGNIFICANT CHANGE UP (ref 80–100)
MONOCYTES # BLD AUTO: 0.42 K/UL — SIGNIFICANT CHANGE UP (ref 0–0.9)
MONOCYTES NFR BLD AUTO: 5.6 % — SIGNIFICANT CHANGE UP (ref 2–14)
NEUTROPHILS # BLD AUTO: 5.68 K/UL — SIGNIFICANT CHANGE UP (ref 1.8–7.4)
NEUTROPHILS NFR BLD AUTO: 75.3 % — SIGNIFICANT CHANGE UP (ref 43–77)
NRBC BLD AUTO-RTO: 0 /100 WBCS — SIGNIFICANT CHANGE UP (ref 0–0)
NRBC BLD AUTO-RTO: 0 /100 WBCS — SIGNIFICANT CHANGE UP (ref 0–0)
NT-PROBNP SERPL-SCNC: 544 PG/ML — HIGH (ref 0–300)
PCO2 BLDA: 43 MMHG — SIGNIFICANT CHANGE UP (ref 32–45)
PH BLDA: 7.38 — SIGNIFICANT CHANGE UP (ref 7.35–7.45)
PHOSPHATE SERPL-MCNC: 3.9 MG/DL — SIGNIFICANT CHANGE UP (ref 2.5–4.5)
PLATELET # BLD AUTO: 200 K/UL — SIGNIFICANT CHANGE UP (ref 150–400)
PLATELET # BLD AUTO: 245 K/UL — SIGNIFICANT CHANGE UP (ref 150–400)
PO2 BLDA: 97 MMHG — SIGNIFICANT CHANGE UP (ref 83–108)
POTASSIUM SERPL-MCNC: 3.8 MMOL/L — SIGNIFICANT CHANGE UP (ref 3.5–5.3)
POTASSIUM SERPL-MCNC: 4.1 MMOL/L — SIGNIFICANT CHANGE UP (ref 3.5–5.3)
POTASSIUM SERPL-SCNC: 3.8 MMOL/L — SIGNIFICANT CHANGE UP (ref 3.5–5.3)
POTASSIUM SERPL-SCNC: 4.1 MMOL/L — SIGNIFICANT CHANGE UP (ref 3.5–5.3)
PROT SERPL-MCNC: 5.8 G/DL — LOW (ref 6–8.3)
PROT SERPL-MCNC: 7.2 G/DL — SIGNIFICANT CHANGE UP (ref 6–8.3)
PROTHROM AB SERPL-ACNC: 11.2 SEC — SIGNIFICANT CHANGE UP (ref 9.9–13.4)
PROTHROM AB SERPL-ACNC: 12.5 SEC — SIGNIFICANT CHANGE UP (ref 9.9–13.4)
RBC # BLD: 3.92 M/UL — SIGNIFICANT CHANGE UP (ref 3.8–5.2)
RBC # BLD: 4.48 M/UL — SIGNIFICANT CHANGE UP (ref 3.8–5.2)
RBC # FLD: 14.2 % — SIGNIFICANT CHANGE UP (ref 10.3–14.5)
RBC # FLD: 14.3 % — SIGNIFICANT CHANGE UP (ref 10.3–14.5)
RH IG SCN BLD-IMP: POSITIVE — SIGNIFICANT CHANGE UP
SAO2 % BLDA: 98.5 % — HIGH (ref 94–98)
SODIUM SERPL-SCNC: 144 MMOL/L — SIGNIFICANT CHANGE UP (ref 135–145)
SODIUM SERPL-SCNC: 145 MMOL/L — SIGNIFICANT CHANGE UP (ref 135–145)
WBC # BLD: 7.54 K/UL — SIGNIFICANT CHANGE UP (ref 3.8–10.5)
WBC # BLD: 8.1 K/UL — SIGNIFICANT CHANGE UP (ref 3.8–10.5)
WBC # FLD AUTO: 7.54 K/UL — SIGNIFICANT CHANGE UP (ref 3.8–10.5)
WBC # FLD AUTO: 8.1 K/UL — SIGNIFICANT CHANGE UP (ref 3.8–10.5)

## 2025-03-04 PROCEDURE — 93325 DOPPLER ECHO COLOR FLOW MAPG: CPT | Mod: 26,59

## 2025-03-04 PROCEDURE — 93010 ELECTROCARDIOGRAM REPORT: CPT

## 2025-03-04 PROCEDURE — 33361 REPLACE AORTIC VALVE PERQ: CPT | Mod: 62,Q0

## 2025-03-04 PROCEDURE — 93321 DOPPLER ECHO F-UP/LMTD STD: CPT | Mod: 26,59

## 2025-03-04 PROCEDURE — 93308 TTE F-UP OR LMTD: CPT | Mod: 26,59

## 2025-03-04 PROCEDURE — 71045 X-RAY EXAM CHEST 1 VIEW: CPT | Mod: 26

## 2025-03-04 PROCEDURE — 93306 TTE W/DOPPLER COMPLETE: CPT | Mod: 26

## 2025-03-04 DEVICE — GUIDEWIRE STANDARD STRAIGHT .035" X 180CM: Type: IMPLANTABLE DEVICE | Status: FUNCTIONAL

## 2025-03-04 DEVICE — EMERALD GUIDEWIRE 0.35: Type: IMPLANTABLE DEVICE | Status: FUNCTIONAL

## 2025-03-04 DEVICE — GWIRE ROSEN 0.035INX260CM: Type: IMPLANTABLE DEVICE | Status: FUNCTIONAL

## 2025-03-04 DEVICE — INTRODUCER SHEATH KIT GLIDESHEATH SLENDER FLEX STRAIGHT 21G 6F X 10CM: Type: IMPLANTABLE DEVICE | Status: FUNCTIONAL

## 2025-03-04 DEVICE — GWIRE JTIP 1.5MM .035X180CM: Type: IMPLANTABLE DEVICE | Status: FUNCTIONAL

## 2025-03-04 DEVICE — SHEATH INTRODUCER TERUMO PINNACLE CORONARY 8FR X 10CM X 0.038" MINI WIRE: Type: IMPLANTABLE DEVICE | Status: FUNCTIONAL

## 2025-03-04 DEVICE — WIREGUIDE TOROFLEX .018X40CM: Type: IMPLANTABLE DEVICE | Status: FUNCTIONAL

## 2025-03-04 DEVICE — INTRO MICROPUNC 4FRX10CM SS: Type: IMPLANTABLE DEVICE | Status: FUNCTIONAL

## 2025-03-04 DEVICE — GUIDEWIRE GLIDEWIRE ANGLED TIP 0.035" X 180CM STIFF: Type: IMPLANTABLE DEVICE | Status: FUNCTIONAL

## 2025-03-04 DEVICE — CATH DX PIG 145 INFIN 5FRX110CM: Type: IMPLANTABLE DEVICE | Status: FUNCTIONAL

## 2025-03-04 DEVICE — ANGIOSEAL VASC CLOS VIP 8FR: Type: IMPLANTABLE DEVICE | Status: FUNCTIONAL

## 2025-03-04 DEVICE — WIRE GD SAFARI2 275CM XSML CRV: Type: IMPLANTABLE DEVICE | Status: FUNCTIONAL

## 2025-03-04 DEVICE — SHEATH INTRO DRYSEAL FLEX 14FR 33CM: Type: IMPLANTABLE DEVICE | Status: FUNCTIONAL

## 2025-03-04 DEVICE — IMPLANTABLE DEVICE: Type: IMPLANTABLE DEVICE | Status: FUNCTIONAL

## 2025-03-04 DEVICE — CATH DX AL1 INFIN 5FRX100CM: Type: IMPLANTABLE DEVICE | Status: FUNCTIONAL

## 2025-03-04 DEVICE — GWIRE GUID  0.035INX150CM: Type: IMPLANTABLE DEVICE | Status: FUNCTIONAL

## 2025-03-04 DEVICE — BLLN TRUE DIALATION 20MMX4.5CM: Type: IMPLANTABLE DEVICE | Status: FUNCTIONAL

## 2025-03-04 DEVICE — PACING CATH PACEL RIGHT HEART CURVE 5FR KIT: Type: IMPLANTABLE DEVICE | Status: FUNCTIONAL

## 2025-03-04 DEVICE — SYS DEL NAVITOR FLEXNAV LRG: Type: IMPLANTABLE DEVICE | Status: FUNCTIONAL

## 2025-03-04 DEVICE — SUT PERCLOSE PROGLIDE 6FR: Type: IMPLANTABLE DEVICE | Status: FUNCTIONAL

## 2025-03-04 RX ORDER — CEFAZOLIN SODIUM IN 0.9 % NACL 3 G/100 ML
2000 INTRAVENOUS SOLUTION, PIGGYBACK (ML) INTRAVENOUS EVERY 8 HOURS
Refills: 0 | Status: COMPLETED | OUTPATIENT
Start: 2025-03-04 | End: 2025-03-05

## 2025-03-04 RX ORDER — ATORVASTATIN CALCIUM 80 MG/1
40 TABLET, FILM COATED ORAL AT BEDTIME
Refills: 0 | Status: DISCONTINUED | OUTPATIENT
Start: 2025-03-04 | End: 2025-03-04

## 2025-03-04 RX ORDER — ACETAMINOPHEN 500 MG/5ML
650 LIQUID (ML) ORAL EVERY 6 HOURS
Refills: 0 | Status: DISCONTINUED | OUTPATIENT
Start: 2025-03-04 | End: 2025-03-05

## 2025-03-04 RX ORDER — HEPARIN SODIUM 1000 [USP'U]/ML
5000 INJECTION INTRAVENOUS; SUBCUTANEOUS EVERY 8 HOURS
Refills: 0 | Status: DISCONTINUED | OUTPATIENT
Start: 2025-03-04 | End: 2025-03-05

## 2025-03-04 RX ORDER — ASPIRIN 325 MG
162 TABLET ORAL ONCE
Refills: 0 | Status: COMPLETED | OUTPATIENT
Start: 2025-03-04 | End: 2025-03-04

## 2025-03-04 RX ORDER — ACETAMINOPHEN 500 MG/5ML
1000 LIQUID (ML) ORAL ONCE
Refills: 0 | Status: COMPLETED | OUTPATIENT
Start: 2025-03-04 | End: 2025-03-04

## 2025-03-04 RX ORDER — ALPRAZOLAM 0.5 MG
1 TABLET, EXTENDED RELEASE 24 HR ORAL
Refills: 0 | DISCHARGE

## 2025-03-04 RX ORDER — GABAPENTIN 400 MG/1
1 CAPSULE ORAL
Refills: 0 | DISCHARGE

## 2025-03-04 RX ORDER — GABAPENTIN 400 MG/1
100 CAPSULE ORAL DAILY
Refills: 0 | Status: DISCONTINUED | OUTPATIENT
Start: 2025-03-04 | End: 2025-03-05

## 2025-03-04 RX ORDER — POLYETHYLENE GLYCOL 3350 17 G/17G
17 POWDER, FOR SOLUTION ORAL DAILY
Refills: 0 | Status: DISCONTINUED | OUTPATIENT
Start: 2025-03-04 | End: 2025-03-05

## 2025-03-04 RX ORDER — ALPRAZOLAM 0.5 MG
0.25 TABLET, EXTENDED RELEASE 24 HR ORAL
Refills: 0 | Status: DISCONTINUED | OUTPATIENT
Start: 2025-03-04 | End: 2025-03-05

## 2025-03-04 RX ORDER — ASPIRIN 325 MG
81 TABLET ORAL DAILY
Refills: 0 | Status: DISCONTINUED | OUTPATIENT
Start: 2025-03-05 | End: 2025-03-05

## 2025-03-04 RX ADMIN — HEPARIN SODIUM 5000 UNIT(S): 1000 INJECTION INTRAVENOUS; SUBCUTANEOUS at 21:37

## 2025-03-04 RX ADMIN — Medication 162 MILLIGRAM(S): at 10:31

## 2025-03-04 RX ADMIN — Medication 0.25 MILLIGRAM(S): at 22:45

## 2025-03-04 RX ADMIN — Medication 100 MILLIGRAM(S): at 21:36

## 2025-03-04 RX ADMIN — Medication 400 MILLIGRAM(S): at 18:26

## 2025-03-04 RX ADMIN — Medication 1000 MILLIGRAM(S): at 18:56

## 2025-03-04 RX ADMIN — Medication 100 MILLIEQUIVALENT(S): at 16:16

## 2025-03-04 NOTE — PRE-ANESTHESIA EVALUATION ADULT - NSANTHBMIRD_ENT_A_CORE
03/29/24 1731   Seclusion or Restraint Order Upon Initiation and With Every Order   Attending Provider Notified No (comment)   Attending Provider's Name Dr. Farias   Ordering Provider's Name Dr. Ferris   In Person Face to Face Assessment Conducted Yes-Eval of pt's immediate situation, reaction to intervention, complete review of systems assessment, behavioral assessment & review/assessment of hx, drugs & meds, recent labs, etc, behavioral condition, need to continue/terminate restraint/seclusion   RN Notified Provider of Result of Face to Face Yes   Describe adverse physical outcome none   Describe actions taken Room seclusion   Describe follow-up needed none      No

## 2025-03-04 NOTE — PRE-ANESTHESIA EVALUATION ADULT - NSANTHOSAYNRD_GEN_A_CORE
NSTEMI No. JUSTIN screening performed.  STOP BANG Legend: 0-2 = LOW Risk; 3-4 = INTERMEDIATE Risk; 5-8 = HIGH Risk

## 2025-03-04 NOTE — H&P ADULT - ASSESSMENT
83 YO Female w/ PMHx of anxiety disorder, macular degeneration, ocular migraine, ataxia, post-herpetic neuralgia, thyroid CA s/p partial thyroidectomy, hemorrhoids, HTN, HLD, HFpEF, and LFLG severe AS who was referred to Dr. Wynn for management of AS. Pt has been c/o dyspnea with moderate exertion and occasional chest tightness lasting 10-15 minutes at rest which she attributes to her anxiety/GERD. Pt also reports intermittent LE edema x 1-2 years. Of note, about 6 months ago, pt had a superificial thrombosis on the LLE for which she took 4 weeks of ASA, during that time she had episodes of hemorrhoidal bleeding which resolved after d/c ASA. TTE 1/14/25 revealed EF 55-60%, moderate grade 2 LV diastolic dysfunction, mild MR, mild TR, moderate LVH, moderate to severe AS (PG 45 mmHg, MG 26 mmHg, SUSI 0.82 ), normal BIV function. Cardiac cath (2/18/25): LM normal, mLAD 50%, LCx 50%, RCA mild diffuse disease. Patient presents to [facility] today for planned TF TAVR with Dr. Wynn.     PLAN:  -To OR for TF TAVR with Dr. Wynn 85 YO Female w/ PMHx of anxiety disorder, macular degeneration, ocular migraine, ataxia, post-herpetic neuralgia, thyroid CA s/p partial thyroidectomy, hemorrhoids, HTN, HLD, HFpEF, and LFLG severe AS who was referred to Dr. Wynn for management of AS. Pt has been c/o dyspnea with moderate exertion and occasional chest tightness lasting 10-15 minutes at rest which she attributes to her anxiety/GERD. Pt also reports intermittent LE edema x 1-2 years. Of note, about 6 months ago, pt had a superificial thrombosis on the LLE for which she took 4 weeks of ASA, during that time she had episodes of hemorrhoidal bleeding which resolved after d/c ASA. TTE 1/14/25 revealed EF 55-60%, moderate grade 2 LV diastolic dysfunction, mild MR, mild TR, moderate LVH, moderate to severe AS (PG 45 mmHg, MG 26 mmHg, SUSI 0.82 ), normal BIV function. Cardiac cath (2/18/25): LM normal, mLAD 50%, LCx 50%, RCA mild diffuse disease. Patient presents to [facility] today for planned TF TAVR with Dr. Wynn.     PLAN:  -To OR for TF TAVR with Dr. Wynn    Admit under Dr. Wynn via same day surgery. Consent signed, placed on chart.  Risks/benefits reviewed, patient understands and agrees. T&S ordered and blood products placed on hold for OR.  To 9L post-op.

## 2025-03-04 NOTE — BRIEF OPERATIVE NOTE - OPERATION/FINDINGS
Access:   Access:  RCFA- primary  R radial artery- secondary  LCFV- TVP Access:  RCFA primary - 14Fr- perc x 2 and angio sealed  R Radial - secondary- 6Fr- TR band  LCFV - TVP

## 2025-03-04 NOTE — H&P ADULT - HISTORY OF PRESENT ILLNESS
85 YO Female w/ PMHx of anxiety disorder, macular degeneration, ocular migraine, ataxia, post-herpetic neuralgia, thyroid CA s/p partial thyroidectomy, hemorrhoids, HTN, HLD, HFpEF, and LFLG severe AS who was referred to Dr. Wynn for management of AS. Pt has been c/o dyspnea with moderate exertion and occasional chest tightness lasting 10-15 minutes at rest which she attributes to her anxiety/GERD. Pt also reports intermittent LE edema x 1-2 years. Of note, about 6 months ago, pt had a superificial thrombosis on the LLE for which she took 4 weeks of ASA, during that time she had episodes of hemorrhoidal bleeding which resolved after d/c ASA. TTE 1/14/25 revealed EF 55-60%, moderate grade 2 LV diastolic dysfunction, mild MR, mild TR, moderate LVH, moderate to severe AS (PG 45 mmHg, MG 26 mmHg, SUSI 0.82 ), normal BIV function. Cardiac cath (2/18/25): LM normal, mLAD 50%, LCx 50%, RCA mild diffuse disease. Patient presents to [facility] today for planned TF TAVR with Dr. Wynn.        85 YO Female w/ PMHx of anxiety disorder, macular degeneration, ocular migraine, ataxia, post-herpetic neuralgia, thyroid CA s/p partial thyroidectomy, hemorrhoids, HTN, HLD, HFpEF, and LFLG severe AS who was referred to Dr. Wynn for management of AS. Pt has been c/o dyspnea with moderate exertion and occasional chest tightness lasting 10-15 minutes at rest which she attributes to her anxiety/GERD. Pt also reports intermittent LE edema x 1-2 years. Of note, about 6 months ago, pt had a superificial thrombosis on the LLE for which she took 4 weeks of ASA, during that time she had episodes of hemorrhoidal bleeding which resolved after d/c ASA. TTE 1/14/25 revealed EF 55-60%, moderate grade 2 LV diastolic dysfunction, mild MR, mild TR, moderate LVH, moderate to severe AS (PG 45 mmHg, MG 26 mmHg, SUSI 0.82 ), normal BIV function. Cardiac cath (2/18/25): LM normal, mLAD 50%, LCx 50%, RCA mild diffuse disease. Patient presents to [facility] today for planned TF TAVR with Dr. Wynn.     Patient seen in same day holding area; Reports no changes to PMHx or medications since last seen by our team. Denies acute or current SOB, chest pain, palpitation, N/V/D, fever/chills, recent illness, or any other concerning symptoms.

## 2025-03-04 NOTE — H&P ADULT - NSICDXPASTMEDICALHX_GEN_ALL_CORE_FT
PAST MEDICAL HISTORY:  Anxiety     Hemorrhoids     HF (heart failure)     HLD (hyperlipidemia)     HTN (hypertension)     Macular degeneration     Severe aortic stenosis     Thyroid cancer

## 2025-03-04 NOTE — CHART NOTE - NSCHARTNOTEFT_GEN_A_CORE
Access: RCFA 14F   TVP: LCFV TVP in place  Pre-existing rhythm issues:  none  QRS: 88  Intra-op rhythm issues: widened   Post-op rhythm issues: LBBB QRS: 134  TR Band: R radial     VS: normotensive, in sinus   General: NAD   Neurological: AOx3. Motor skills grossly intact  Cardiovascular: Normal S1/S2. Regular rate/rhythm. No murmurs  Respiratory: Lungs CTA bilaterally. No wheezing or rales  Gastrointestinal: +BS in all 4 quadrants. Non-distended. Soft. Non-tender  Extremities: Strength 5/5 b/l upper/lower extremities. Sensation grossly intact upper/lower extremities. No edema. No calf tenderness.  Vascular: Radial 2+bilaterally, DP 2+ b/l  Groin sites: b/l groin sites soft without hematoma, L groin TVP     Bed rest: until TVP out  Anti-platelet: ASA only  Dispo: home tomorrow pending TVP removal    TTE/EKG ordered: yes

## 2025-03-05 ENCOUNTER — NON-APPOINTMENT (OUTPATIENT)
Age: 85
End: 2025-03-05

## 2025-03-05 ENCOUNTER — TRANSCRIPTION ENCOUNTER (OUTPATIENT)
Age: 85
End: 2025-03-05

## 2025-03-05 VITALS — TEMPERATURE: 98 F

## 2025-03-05 LAB
ALBUMIN SERPL ELPH-MCNC: 3.7 G/DL — SIGNIFICANT CHANGE UP (ref 3.3–5)
ALP SERPL-CCNC: 67 U/L — SIGNIFICANT CHANGE UP (ref 40–120)
ALT FLD-CCNC: 10 U/L — SIGNIFICANT CHANGE UP (ref 10–45)
ANION GAP SERPL CALC-SCNC: 10 MMOL/L — SIGNIFICANT CHANGE UP (ref 5–17)
APTT BLD: 29.6 SEC — SIGNIFICANT CHANGE UP (ref 24.5–35.6)
AST SERPL-CCNC: 15 U/L — SIGNIFICANT CHANGE UP (ref 10–40)
BASE EXCESS BLDA CALC-SCNC: 1.5 MMOL/L — SIGNIFICANT CHANGE UP (ref -2–3)
BASOPHILS # BLD AUTO: 0.03 K/UL — SIGNIFICANT CHANGE UP (ref 0–0.2)
BASOPHILS NFR BLD AUTO: 0.4 % — SIGNIFICANT CHANGE UP (ref 0–2)
BILIRUB SERPL-MCNC: 0.5 MG/DL — SIGNIFICANT CHANGE UP (ref 0.2–1.2)
BUN SERPL-MCNC: 14 MG/DL — SIGNIFICANT CHANGE UP (ref 7–23)
CALCIUM SERPL-MCNC: 7.9 MG/DL — LOW (ref 8.4–10.5)
CHLORIDE SERPL-SCNC: 107 MMOL/L — SIGNIFICANT CHANGE UP (ref 96–108)
CO2 BLDA-SCNC: 29 MMOL/L — HIGH (ref 19–24)
CO2 SERPL-SCNC: 25 MMOL/L — SIGNIFICANT CHANGE UP (ref 22–31)
CREAT SERPL-MCNC: 0.5 MG/DL — SIGNIFICANT CHANGE UP (ref 0.5–1.3)
EGFR: 91 ML/MIN/1.73M2 — SIGNIFICANT CHANGE UP
EGFR: 91 ML/MIN/1.73M2 — SIGNIFICANT CHANGE UP
EOSINOPHIL # BLD AUTO: 0.03 K/UL — SIGNIFICANT CHANGE UP (ref 0–0.5)
EOSINOPHIL NFR BLD AUTO: 0.4 % — SIGNIFICANT CHANGE UP (ref 0–6)
GAS PNL BLDA: SIGNIFICANT CHANGE UP
GLUCOSE SERPL-MCNC: 100 MG/DL — HIGH (ref 70–99)
HCO3 BLDA-SCNC: 27 MMOL/L — SIGNIFICANT CHANGE UP (ref 21–28)
HCT VFR BLD CALC: 33.1 % — LOW (ref 34.5–45)
HGB BLD-MCNC: 10.9 G/DL — LOW (ref 11.5–15.5)
IMM GRANULOCYTES NFR BLD AUTO: 0.5 % — SIGNIFICANT CHANGE UP (ref 0–0.9)
INR BLD: 1.06 — SIGNIFICANT CHANGE UP (ref 0.85–1.16)
LYMPHOCYTES # BLD AUTO: 1.2 K/UL — SIGNIFICANT CHANGE UP (ref 1–3.3)
LYMPHOCYTES # BLD AUTO: 14.3 % — SIGNIFICANT CHANGE UP (ref 13–44)
MAGNESIUM SERPL-MCNC: 2 MG/DL — SIGNIFICANT CHANGE UP (ref 1.6–2.6)
MCHC RBC-ENTMCNC: 30 PG — SIGNIFICANT CHANGE UP (ref 27–34)
MCHC RBC-ENTMCNC: 32.9 G/DL — SIGNIFICANT CHANGE UP (ref 32–36)
MCV RBC AUTO: 91.2 FL — SIGNIFICANT CHANGE UP (ref 80–100)
MONOCYTES # BLD AUTO: 0.71 K/UL — SIGNIFICANT CHANGE UP (ref 0–0.9)
MONOCYTES NFR BLD AUTO: 8.4 % — SIGNIFICANT CHANGE UP (ref 2–14)
NEUTROPHILS # BLD AUTO: 6.41 K/UL — SIGNIFICANT CHANGE UP (ref 1.8–7.4)
NEUTROPHILS NFR BLD AUTO: 76 % — SIGNIFICANT CHANGE UP (ref 43–77)
NRBC BLD AUTO-RTO: 0 /100 WBCS — SIGNIFICANT CHANGE UP (ref 0–0)
PCO2 BLDA: 46 MMHG — HIGH (ref 32–45)
PH BLDA: 7.38 — SIGNIFICANT CHANGE UP (ref 7.35–7.45)
PHOSPHATE SERPL-MCNC: 4.1 MG/DL — SIGNIFICANT CHANGE UP (ref 2.5–4.5)
PLATELET # BLD AUTO: 160 K/UL — SIGNIFICANT CHANGE UP (ref 150–400)
PO2 BLDA: 145 MMHG — HIGH (ref 83–108)
POTASSIUM SERPL-MCNC: 3.6 MMOL/L — SIGNIFICANT CHANGE UP (ref 3.5–5.3)
POTASSIUM SERPL-SCNC: 3.6 MMOL/L — SIGNIFICANT CHANGE UP (ref 3.5–5.3)
PROT SERPL-MCNC: 5.6 G/DL — LOW (ref 6–8.3)
PROTHROM AB SERPL-ACNC: 12.4 SEC — SIGNIFICANT CHANGE UP (ref 9.9–13.4)
RBC # BLD: 3.63 M/UL — LOW (ref 3.8–5.2)
RBC # FLD: 14.3 % — SIGNIFICANT CHANGE UP (ref 10.3–14.5)
SAO2 % BLDA: 99.6 % — HIGH (ref 94–98)
SODIUM SERPL-SCNC: 142 MMOL/L — SIGNIFICANT CHANGE UP (ref 135–145)
WBC # BLD: 8.42 K/UL — SIGNIFICANT CHANGE UP (ref 3.8–10.5)
WBC # FLD AUTO: 8.42 K/UL — SIGNIFICANT CHANGE UP (ref 3.8–10.5)

## 2025-03-05 PROCEDURE — 85730 THROMBOPLASTIN TIME PARTIAL: CPT

## 2025-03-05 PROCEDURE — 85025 COMPLETE CBC W/AUTO DIFF WBC: CPT

## 2025-03-05 PROCEDURE — 83735 ASSAY OF MAGNESIUM: CPT

## 2025-03-05 PROCEDURE — 71045 X-RAY EXAM CHEST 1 VIEW: CPT

## 2025-03-05 PROCEDURE — C1769: CPT

## 2025-03-05 PROCEDURE — C1725: CPT

## 2025-03-05 PROCEDURE — L8699: CPT

## 2025-03-05 PROCEDURE — 84100 ASSAY OF PHOSPHORUS: CPT

## 2025-03-05 PROCEDURE — 85027 COMPLETE CBC AUTOMATED: CPT

## 2025-03-05 PROCEDURE — C1889: CPT

## 2025-03-05 PROCEDURE — C1887: CPT

## 2025-03-05 PROCEDURE — 99239 HOSP IP/OBS DSCHRG MGMT >30: CPT

## 2025-03-05 PROCEDURE — C1760: CPT

## 2025-03-05 PROCEDURE — 83880 ASSAY OF NATRIURETIC PEPTIDE: CPT

## 2025-03-05 PROCEDURE — 80053 COMPREHEN METABOLIC PANEL: CPT

## 2025-03-05 PROCEDURE — 82803 BLOOD GASES ANY COMBINATION: CPT

## 2025-03-05 PROCEDURE — 36415 COLL VENOUS BLD VENIPUNCTURE: CPT

## 2025-03-05 PROCEDURE — 86850 RBC ANTIBODY SCREEN: CPT

## 2025-03-05 PROCEDURE — 86900 BLOOD TYPING SEROLOGIC ABO: CPT

## 2025-03-05 PROCEDURE — 86901 BLOOD TYPING SEROLOGIC RH(D): CPT

## 2025-03-05 PROCEDURE — 93306 TTE W/DOPPLER COMPLETE: CPT

## 2025-03-05 PROCEDURE — C1894: CPT

## 2025-03-05 PROCEDURE — 85610 PROTHROMBIN TIME: CPT

## 2025-03-05 PROCEDURE — 71045 X-RAY EXAM CHEST 1 VIEW: CPT | Mod: 26

## 2025-03-05 PROCEDURE — 86923 COMPATIBILITY TEST ELECTRIC: CPT

## 2025-03-05 PROCEDURE — 93005 ELECTROCARDIOGRAM TRACING: CPT

## 2025-03-05 RX ORDER — ACETAMINOPHEN 500 MG/5ML
2 LIQUID (ML) ORAL
Qty: 120 | Refills: 0
Start: 2025-03-05 | End: 2025-03-19

## 2025-03-05 RX ORDER — LISINOPRIL 30 MG/1
1 TABLET ORAL
Refills: 0 | DISCHARGE

## 2025-03-05 RX ORDER — ESOMEPRAZOLE MAGNESIUM 40 MG/1
1 CAPSULE, DELAYED RELEASE ORAL
Refills: 0 | DISCHARGE

## 2025-03-05 RX ORDER — ASPIRIN 325 MG
1 TABLET ORAL
Qty: 30 | Refills: 0
Start: 2025-03-05 | End: 2025-04-03

## 2025-03-05 RX ADMIN — Medication 40 MILLIEQUIVALENT(S): at 10:45

## 2025-03-05 RX ADMIN — Medication 81 MILLIGRAM(S): at 13:26

## 2025-03-05 RX ADMIN — Medication 100 MILLIGRAM(S): at 05:58

## 2025-03-05 RX ADMIN — HEPARIN SODIUM 5000 UNIT(S): 1000 INJECTION INTRAVENOUS; SUBCUTANEOUS at 05:58

## 2025-03-05 RX ADMIN — Medication 40 MILLIEQUIVALENT(S): at 07:20

## 2025-03-05 RX ADMIN — Medication 40 MILLIGRAM(S): at 06:01

## 2025-03-05 NOTE — DISCHARGE NOTE PROVIDER - NSDCCPTREATMENT_GEN_ALL_CORE_FT
PRINCIPAL PROCEDURE  Procedure: TAVR, percutaneous  Findings and Treatment: TF TAVR 27mm Navitor

## 2025-03-05 NOTE — DISCHARGE NOTE PROVIDER - NSDCMRMEDTOKEN_GEN_ALL_CORE_FT
acetaminophen 325 mg oral tablet: 2 tab(s) orally every 6 hours as needed for Temp greater or equal to 38C (100.4F), Mild Pain (1 - 3), Moderate Pain (4 - 6)  ALPRAZolam 0.25 mg oral tablet: 1 tab(s) orally twice as needed for  anxiety  aspirin 81 mg oral tablet, chewable: 1 tab(s) orally once a day  Neurontin 100 mg oral capsule: 1 cap(s) orally once a day  pantoprazole 40 mg oral delayed release tablet: 1 tab(s) orally once a day (before a meal)  simvastatin 40 mg oral tablet: 1 tab(s) orally once a day

## 2025-03-05 NOTE — DISCHARGE NOTE NURSING/CASE MANAGEMENT/SOCIAL WORK - FINANCIAL ASSISTANCE
Health system provides services at a reduced cost to those who are determined to be eligible through Health system’s financial assistance program. Information regarding Health system’s financial assistance program can be found by going to [email or web address] or by calling [phone].

## 2025-03-05 NOTE — DISCHARGE NOTE NURSING/CASE MANAGEMENT/SOCIAL WORK - PATIENT PORTAL LINK FT
You can access the FollowMyHealth Patient Portal offered by [facility] by registering at the following website: [email or web address]. By joining [facility]’s FollowMyHealth portal, you will also be able to view your health information using other applications (apps) compatible with our system.

## 2025-03-05 NOTE — DISCHARGE NOTE PROVIDER - NSDCCPCAREPLAN_GEN_ALL_CORE_FT
PRINCIPAL DISCHARGE DIAGNOSIS  Diagnosis: AS (aortic stenosis)  Assessment and Plan of Treatment:

## 2025-03-05 NOTE — DISCHARGE NOTE PROVIDER - PROVIDER TOKENS
PROVIDER:[TOKEN:[[id number]],FOLLOWUP:[1 week]],PROVIDER:[TOKEN:[[id number]],FOLLOWUP:[2 weeks]]

## 2025-03-05 NOTE — DISCHARGE NOTE PROVIDER - NSDCFUADDAPPT_GEN_ALL_CORE_FT
Our office will call you with the times and dates of your follow up appointments, if you do not hear from them by Friday, please call [phone].    You had a MCOT monitor (an external cardiac rhythm monitoring device) placed on your day of discharge.  This helps us monitor your heart while you are out of the hospital for 30 days after discharge. Should your heart go into an abnormal or dangerous rhythm you will receieve a call from the MCOT team and your Structural Heart team of Doctors and PA's will be notified.    1. Keep the monitor within 30 feet of you at all times.  2. When you feel any symptom (chest pain, dizziness, palpitations, weakness, fatigue or anything outside of your normal), press the “Record Symptoms” button on the main phone of your phone  3. Shower or exercise as normal whilewearing the MCOT Patch. Do not swim or take a bath. Patch is water-resistant, not waterproof  4. When the battery is low on the phone or on the device, use the supplied . The monitor will show a warning message when the battery is low.  5. Do not remove the patch from yourskin after you begin monitoring. With normal wear, each patch should last 5 days. To replace the patch follow instructions in the MCOT box with the Patch Guide  6. Any issues with the MCOT device or phone please call Customer Service at [phone].  7. If you have any other questions at all please call the Structural Heart office at [phone]

## 2025-03-05 NOTE — DISCHARGE NOTE PROVIDER - CARE PROVIDER_API CALL
Ned Wynn  Interventional Cardiology  [address]  [place] [address]  Phone: [phone]  Fax: [phone]  Follow Up Time: 1 week    Parviz Peterson  Cardiovascular Disease  [facility] [place] [address]  Phone: [phone]  Fax: [phone]  Follow Up Time: 2 weeks

## 2025-03-05 NOTE — DISCHARGE NOTE PROVIDER - CARE PROVIDERS DIRECT ADDRESSES
[email or web address]

## 2025-03-05 NOTE — DISCHARGE NOTE PROVIDER - HOSPITAL COURSE
Patient discussed on morning rounds with Dr. Vizcaino  Operation Date: 3/4/25 s/p TAVR (27 mm Navitor)   Primary Surgeon/Attending MD: Dr. Wynn  Referring Physician: Dr. Parviz Peterson  _ _ _ _ _ _ _ _ _ _ _ _   HOSPITAL COURSE:   83 YO Female w/ PMHx of anxiety disorder, macular degeneration, ocular migraine, ataxia, post-herpetic neuralgia, thyroid CA s/p partial thyroidectomy, hemorrhoids, HTN, HLD, HFpEF, and LFLG severe AS who was referred to Dr. Wynn for management of AS. Pt has been c/o dyspnea with moderate exertion and occasional chest tightness lasting 10-15 minutes at rest which she attributes to her anxiety/GERD. Pt also reports intermittent LE edema x 1-2 years. Of note, about 6 months ago, pt had a superificial thrombosis on the LLE for which she took 4 weeks of ASA, during that time she had episodes of hemorrhoidal bleeding which resolved after d/c ASA. TTE 1/14/25 revealed EF 55-60%, moderate grade 2 LV diastolic dysfunction, mild MR, mild TR, moderate LVH, moderate to severe AS (PG 45 mmHg, MG 26 mmHg, SUSI 0.82 ), normal BIV function. Cardiac cath (2/18/25): LM normal and mild CAD. 3/5/2025 patient underwent a TAVR (27 Navitor) with Dr. Wynn. Intraoperative course was uncomplicated. P0D 0 patient arrived to mini-ICU with TVP in place. TVP removed. Post-op TTE shows good valve placement and function with no paravalvular regurgitation. POD 1 A-line removed. Patient ambulated without difficulties. Groin sites dry and stable. Patient to go home on ASA and hold home beta blocker, per Dr. Vizcaino. Cleared for discharge per Dr. Vizcaino. At time of discharge she is hemodynamically stable, voiding well, passing gas, ambulating in the hallway, and pain controlled under oral regimen.    _ _ _ _ _ _ _ _ _ _ _ _   DISCHARGE PHYSICAL EXAM:   General: Sitting in bed comfortably in NAD  Neuro: A&Ox3, no focal deficits   HEENT: NCAT, EOMI   Cardiac: Regular rate and rhythm, normal S1 and S2. No m/r/g   Pulm: Breathing comfortably on room air. No signs of respiratory distress. Lungs are CTA b/l without wheezes, rales, or rhonchi   Abdomen: Soft, non-distended, non-tender. + bowel sounds   : Suction catheter in place   Extremities: Warm and well perfused, no peripheral edema, distal pulses 2+. No calf tenderness. SCDs and TEDs in place  MSK: Full AROM   Wound: Bilateral groin sites are dry and without hematoma.   _ _ _ _ _ _ _ _ _ _ _ _   MEDICATION DISCHARGE CHECKLIST   TAVR        Anticoagulation plan: [ x] Aspirin       Keep one:        Chronic diastolic heart failure treated with TAVR     _ _ _ _ _ _ _ _ _ _ _   RELEVANT LABS/IMAGING:   Post-op TTE:  CONCLUSIONS:     1. Left ventricular cavity is normal in size. Left ventricular systolic   function is normal with an ejection fraction of 65 % by Clemente's method   of disks. There are no regional wall motion abnormalities seen.  2. There is normal LV mass and concentric remodeling.   3. There is moderate (grade 2) left ventricular diastolic dysfunction.   4. Normal right ventricular cavity size, with normal wall thickness, and   normal right ventricular systolic function.   5. Left atrium is mildly dilated.   6. A Transcatheter deployed (TAVR) valve replacement is present in the   aortic position   7. No evidence of aortic regurgitation.   8. Mild tricuspid regurgitation.   9. Mild to moderate pulmonic regurgitation.  10. Estimated pulmonary artery systolic pressure is 25 mmHg.  11. No pericardial effusion seen.  12. No prior echocardiogram is available for comparison.      _  _ _ _ _ _ _ _ _ _ _   CLINICAL FOLLOW UP NEEDS:      [ ] Lab work needed:      [ ] Imaging needed:      [ x] Home equipment: MCOT           Type: (i.e. wound vac, pneumostat, prevena, wet/dry dressings, picc/midlines, MCOT, dozier etc)   _ _ _ _ _ _ _ _ _ _ _ _   Over 35 minutes was spent with the patient reviewing the discharge material including medications, follow up appointments, recovery, concerning symptoms, and how to contact their health care providers if they have questions.

## 2025-03-05 NOTE — DISCHARGE NOTE NURSING/CASE MANAGEMENT/SOCIAL WORK - NSDCFUADDAPPT_GEN_ALL_CORE_FT
Our office will call you with the times and dates of your follow up appointments, if you do not hear from them by Friday, please call [phone].    You had a MCOT monitor (an external cardiac rhythm monitoring device) placed on your day of discharge.  This helps us monitor your heart while you are out of the hospital for 30 days after discharge. Should your heart go into an abnormal or dangerous rhythm you will receieve a call from the MCOT team and your Structural Heart team of Doctors and PA's will be notified.    1. Keep the monitor within 30 feet of you at all times.  2. When you feel any symptom (chest pain, dizziness, palpitations, weakness, fatigue or anything outside of your normal), press the “Record Symptoms” button on the main phone of your phone  3. Shower or exercise as normal whilewearing the MCOT Patch. Do not swim or take a bath. Patch is water-resistant, not waterproof  4. When the battery is low on the phone or on the device, use the supplied . The monitor will show a warning message when the battery is low.  5. Do not remove the patch from yourskin after you begin monitoring. With normal wear, each patch should last 5 days. To replace the patch follow instructions in the MCOT box with the Patch Guide  6. Any issues with the MCOT device or phone please call Customer Service at [phone].  7. If you have any other questions at all please call the Structural Heart office at [phone]

## 2025-03-06 ENCOUNTER — APPOINTMENT (OUTPATIENT)
Dept: CARE COORDINATION | Facility: HOME HEALTH | Age: 85
End: 2025-03-06

## 2025-03-06 VITALS
OXYGEN SATURATION: 97 % | DIASTOLIC BLOOD PRESSURE: 78 MMHG | HEART RATE: 76 BPM | BODY MASS INDEX: 27.57 KG/M2 | SYSTOLIC BLOOD PRESSURE: 135 MMHG | RESPIRATION RATE: 18 BRPM | WEIGHT: 176 LBS

## 2025-03-06 DIAGNOSIS — Z95.2 PRESENCE OF PROSTHETIC HEART VALVE: ICD-10-CM

## 2025-03-06 PROBLEM — Z95.3 S/P TAVR (TRANSCATHETER AORTIC VALVE REPLACEMENT), BIOPROSTHETIC: Status: ACTIVE | Noted: 2025-03-06

## 2025-03-06 RX ORDER — PANTOPRAZOLE SODIUM 40 MG/1
40 TABLET, DELAYED RELEASE ORAL
Qty: 30 | Refills: 0 | Status: ACTIVE | COMMUNITY

## 2025-03-10 DIAGNOSIS — I25.10 ATHEROSCLEROTIC HEART DISEASE OF NATIVE CORONARY ARTERY WITHOUT ANGINA PECTORIS: ICD-10-CM

## 2025-03-10 DIAGNOSIS — E89.0 POSTPROCEDURAL HYPOTHYROIDISM: ICD-10-CM

## 2025-03-10 DIAGNOSIS — Z85.850 PERSONAL HISTORY OF MALIGNANT NEOPLASM OF THYROID: ICD-10-CM

## 2025-03-10 DIAGNOSIS — E78.5 HYPERLIPIDEMIA, UNSPECIFIED: ICD-10-CM

## 2025-03-10 DIAGNOSIS — G43.B0 OPHTHALMOPLEGIC MIGRAINE, NOT INTRACTABLE: ICD-10-CM

## 2025-03-10 DIAGNOSIS — I50.32 CHRONIC DIASTOLIC (CONGESTIVE) HEART FAILURE: ICD-10-CM

## 2025-03-10 DIAGNOSIS — Z88.1 ALLERGY STATUS TO OTHER ANTIBIOTIC AGENTS: ICD-10-CM

## 2025-03-10 DIAGNOSIS — I11.0 HYPERTENSIVE HEART DISEASE WITH HEART FAILURE: ICD-10-CM

## 2025-03-10 DIAGNOSIS — Z00.6 ENCOUNTER FOR EXAMINATION FOR NORMAL COMPARISON AND CONTROL IN CLINICAL RESEARCH PROGRAM: ICD-10-CM

## 2025-03-10 DIAGNOSIS — Z79.82 LONG TERM (CURRENT) USE OF ASPIRIN: ICD-10-CM

## 2025-03-10 DIAGNOSIS — I08.3 COMBINED RHEUMATIC DISORDERS OF MITRAL, AORTIC AND TRICUSPID VALVES: ICD-10-CM

## 2025-03-10 DIAGNOSIS — H35.30 UNSPECIFIED MACULAR DEGENERATION: ICD-10-CM

## 2025-03-10 DIAGNOSIS — R27.0 ATAXIA, UNSPECIFIED: ICD-10-CM

## 2025-03-10 DIAGNOSIS — I10 ESSENTIAL (PRIMARY) HYPERTENSION: ICD-10-CM

## 2025-03-10 DIAGNOSIS — K64.9 UNSPECIFIED HEMORRHOIDS: ICD-10-CM

## 2025-03-10 DIAGNOSIS — B02.29 OTHER POSTHERPETIC NERVOUS SYSTEM INVOLVEMENT: ICD-10-CM

## 2025-03-10 DIAGNOSIS — Z90.710 ACQUIRED ABSENCE OF BOTH CERVIX AND UTERUS: ICD-10-CM

## 2025-03-11 PROBLEM — I35.0 NONRHEUMATIC AORTIC (VALVE) STENOSIS: Chronic | Status: ACTIVE | Noted: 2025-03-04

## 2025-03-12 DIAGNOSIS — R09.89 OTHER SPECIFIED SYMPTOMS AND SIGNS INVOLVING THE CIRCULATORY AND RESPIRATORY SYSTEMS: ICD-10-CM

## 2025-03-14 ENCOUNTER — APPOINTMENT (OUTPATIENT)
Dept: CARDIOTHORACIC SURGERY | Facility: CLINIC | Age: 85
End: 2025-03-14
Payer: MEDICARE

## 2025-03-14 PROCEDURE — 99213 OFFICE O/P EST LOW 20 MIN: CPT | Mod: 2W

## 2025-03-18 ENCOUNTER — NON-APPOINTMENT (OUTPATIENT)
Age: 85
End: 2025-03-18

## 2025-03-18 ENCOUNTER — LABORATORY RESULT (OUTPATIENT)
Age: 85
End: 2025-03-18

## 2025-03-18 ENCOUNTER — APPOINTMENT (OUTPATIENT)
Dept: HEART AND VASCULAR | Facility: CLINIC | Age: 85
End: 2025-03-18
Payer: MEDICARE

## 2025-03-18 VITALS
RESPIRATION RATE: 14 BRPM | SYSTOLIC BLOOD PRESSURE: 150 MMHG | HEART RATE: 95 BPM | BODY MASS INDEX: 28.25 KG/M2 | DIASTOLIC BLOOD PRESSURE: 90 MMHG | HEIGHT: 67 IN | WEIGHT: 180 LBS

## 2025-03-18 DIAGNOSIS — Z95.2 PRESENCE OF PROSTHETIC HEART VALVE: ICD-10-CM

## 2025-03-18 DIAGNOSIS — I35.1 NONRHEUMATIC AORTIC (VALVE) INSUFFICIENCY: ICD-10-CM

## 2025-03-18 DIAGNOSIS — K11.20 SIALOADENITIS, UNSPECIFIED: ICD-10-CM

## 2025-03-18 DIAGNOSIS — I10 ESSENTIAL (PRIMARY) HYPERTENSION: ICD-10-CM

## 2025-03-18 DIAGNOSIS — I35.0 NONRHEUMATIC AORTIC (VALVE) STENOSIS: ICD-10-CM

## 2025-03-18 DIAGNOSIS — I65.23 OCCLUSION AND STENOSIS OF BILATERAL CAROTID ARTERIES: ICD-10-CM

## 2025-03-18 DIAGNOSIS — E78.5 HYPERLIPIDEMIA, UNSPECIFIED: ICD-10-CM

## 2025-03-18 PROCEDURE — 93321 DOPPLER ECHO F-UP/LMTD STD: CPT

## 2025-03-18 PROCEDURE — 36415 COLL VENOUS BLD VENIPUNCTURE: CPT

## 2025-03-18 PROCEDURE — 99214 OFFICE O/P EST MOD 30 MIN: CPT

## 2025-03-18 PROCEDURE — G2211 COMPLEX E/M VISIT ADD ON: CPT

## 2025-03-18 PROCEDURE — 93325 DOPPLER ECHO COLOR FLOW MAPG: CPT

## 2025-03-18 PROCEDURE — 93000 ELECTROCARDIOGRAM COMPLETE: CPT

## 2025-03-18 PROCEDURE — 93308 TTE F-UP OR LMTD: CPT

## 2025-03-18 PROCEDURE — 93880 EXTRACRANIAL BILAT STUDY: CPT

## 2025-03-18 RX ORDER — LOSARTAN POTASSIUM 25 MG/1
25 TABLET, FILM COATED ORAL DAILY
Qty: 1 | Refills: 6 | Status: ACTIVE | COMMUNITY
Start: 2025-03-18 | End: 1900-01-01

## 2025-03-19 LAB
25(OH)D3 SERPL-MCNC: 39.7 NG/ML
ALBUMIN SERPL ELPH-MCNC: 4.1 G/DL
ALP BLD-CCNC: 95 U/L
ALT SERPL-CCNC: 14 U/L
ANION GAP SERPL CALC-SCNC: 13 MMOL/L
AST SERPL-CCNC: 16 U/L
BASOPHILS # BLD AUTO: 0.05 K/UL
BASOPHILS NFR BLD AUTO: 0.6 %
BILIRUB SERPL-MCNC: 0.3 MG/DL
BUN SERPL-MCNC: 12 MG/DL
CALCIUM SERPL-MCNC: 8.9 MG/DL
CHLORIDE SERPL-SCNC: 107 MMOL/L
CHOLEST SERPL-MCNC: 187 MG/DL
CO2 SERPL-SCNC: 24 MMOL/L
CREAT SERPL-MCNC: 0.57 MG/DL
EGFRCR SERPLBLD CKD-EPI 2021: 88 ML/MIN/1.73M2
EOSINOPHIL # BLD AUTO: 0.17 K/UL
EOSINOPHIL NFR BLD AUTO: 1.9 %
ESTIMATED AVERAGE GLUCOSE: 105 MG/DL
FOLATE SERPL-MCNC: >20 NG/ML
GLUCOSE SERPL-MCNC: 84 MG/DL
HBA1C MFR BLD HPLC: 5.3 %
HCT VFR BLD CALC: 37.7 %
HDLC SERPL-MCNC: 51 MG/DL
HGB BLD-MCNC: 11.8 G/DL
IMM GRANULOCYTES NFR BLD AUTO: 1 %
LDLC SERPL-MCNC: 108 MG/DL
LYMPHOCYTES # BLD AUTO: 1.6 K/UL
LYMPHOCYTES NFR BLD AUTO: 17.7 %
MAN DIFF?: NORMAL
MCHC RBC-ENTMCNC: 29.9 PG
MCHC RBC-ENTMCNC: 31.3 G/DL
MCV RBC AUTO: 95.4 FL
MONOCYTES # BLD AUTO: 0.78 K/UL
MONOCYTES NFR BLD AUTO: 8.6 %
NEUTROPHILS # BLD AUTO: 6.37 K/UL
NEUTROPHILS NFR BLD AUTO: 70.2 %
NONHDLC SERPL-MCNC: 135 MG/DL
NT-PROBNP SERPL-MCNC: 347 PG/ML
PLATELET # BLD AUTO: 236 K/UL
POTASSIUM SERPL-SCNC: 4.6 MMOL/L
PROT SERPL-MCNC: 6.5 G/DL
RBC # BLD: 3.95 M/UL
RBC # FLD: 14.7 %
SODIUM SERPL-SCNC: 144 MMOL/L
T3 SERPL-MCNC: 97 NG/DL
T3FREE SERPL-MCNC: 2.5 PG/ML
T3RU NFR SERPL: 1.1 TBI
T4 FREE SERPL-MCNC: 1 NG/DL
T4 SERPL-MCNC: 7.5 UG/DL
TRIGL SERPL-MCNC: 155 MG/DL
TSH SERPL-ACNC: 1.74 UIU/ML
VIT B12 SERPL-MCNC: 717 PG/ML
WBC # FLD AUTO: 9.06 K/UL

## 2025-04-03 ENCOUNTER — TRANSCRIPTION ENCOUNTER (OUTPATIENT)
Age: 85
End: 2025-04-03

## 2025-04-17 ENCOUNTER — APPOINTMENT (OUTPATIENT)
Dept: HEART AND VASCULAR | Facility: CLINIC | Age: 85
End: 2025-04-17
Payer: MEDICARE

## 2025-04-17 DIAGNOSIS — I10 ESSENTIAL (PRIMARY) HYPERTENSION: ICD-10-CM

## 2025-04-17 DIAGNOSIS — Z95.2 PRESENCE OF PROSTHETIC HEART VALVE: ICD-10-CM

## 2025-04-17 PROCEDURE — 93325 DOPPLER ECHO COLOR FLOW MAPG: CPT

## 2025-04-17 PROCEDURE — 93321 DOPPLER ECHO F-UP/LMTD STD: CPT

## 2025-04-17 PROCEDURE — 93308 TTE F-UP OR LMTD: CPT

## 2025-04-21 ENCOUNTER — OUTPATIENT (OUTPATIENT)
Dept: OUTPATIENT SERVICES | Facility: HOSPITAL | Age: 85
LOS: 1 days | End: 2025-04-21
Payer: MEDICARE

## 2025-04-21 ENCOUNTER — NON-APPOINTMENT (OUTPATIENT)
Age: 85
End: 2025-04-21

## 2025-04-21 ENCOUNTER — APPOINTMENT (OUTPATIENT)
Dept: CARDIOTHORACIC SURGERY | Facility: CLINIC | Age: 85
End: 2025-04-21
Payer: MEDICARE

## 2025-04-21 VITALS
TEMPERATURE: 97 F | BODY MASS INDEX: 27.78 KG/M2 | DIASTOLIC BLOOD PRESSURE: 67 MMHG | HEIGHT: 67 IN | WEIGHT: 177 LBS | OXYGEN SATURATION: 96 % | HEART RATE: 91 BPM | SYSTOLIC BLOOD PRESSURE: 153 MMHG

## 2025-04-21 DIAGNOSIS — Z98.890 OTHER SPECIFIED POSTPROCEDURAL STATES: Chronic | ICD-10-CM

## 2025-04-21 DIAGNOSIS — Z95.2 PRESENCE OF PROSTHETIC HEART VALVE: ICD-10-CM

## 2025-04-21 DIAGNOSIS — Z90.710 ACQUIRED ABSENCE OF BOTH CERVIX AND UTERUS: Chronic | ICD-10-CM

## 2025-04-21 PROCEDURE — 36415 COLL VENOUS BLD VENIPUNCTURE: CPT

## 2025-04-21 PROCEDURE — 99214 OFFICE O/P EST MOD 30 MIN: CPT

## 2025-04-21 PROCEDURE — 80053 COMPREHEN METABOLIC PANEL: CPT

## 2025-04-21 PROCEDURE — 85025 COMPLETE CBC W/AUTO DIFF WBC: CPT

## 2025-04-21 PROCEDURE — 83880 ASSAY OF NATRIURETIC PEPTIDE: CPT

## 2025-07-15 ENCOUNTER — APPOINTMENT (OUTPATIENT)
Dept: HEART AND VASCULAR | Facility: CLINIC | Age: 85
End: 2025-07-15

## 2025-07-22 ENCOUNTER — NON-APPOINTMENT (OUTPATIENT)
Age: 85
End: 2025-07-22

## 2025-07-22 ENCOUNTER — APPOINTMENT (OUTPATIENT)
Dept: HEART AND VASCULAR | Facility: CLINIC | Age: 85
End: 2025-07-22
Payer: MEDICARE

## 2025-07-22 VITALS
WEIGHT: 178 LBS | HEART RATE: 95 BPM | DIASTOLIC BLOOD PRESSURE: 80 MMHG | SYSTOLIC BLOOD PRESSURE: 138 MMHG | RESPIRATION RATE: 14 BRPM | BODY MASS INDEX: 27.94 KG/M2 | HEIGHT: 67 IN

## 2025-07-22 DIAGNOSIS — Z95.2 PRESENCE OF PROSTHETIC HEART VALVE: ICD-10-CM

## 2025-07-22 DIAGNOSIS — I10 ESSENTIAL (PRIMARY) HYPERTENSION: ICD-10-CM

## 2025-07-22 DIAGNOSIS — M54.9 DORSALGIA, UNSPECIFIED: ICD-10-CM

## 2025-07-22 DIAGNOSIS — E78.5 HYPERLIPIDEMIA, UNSPECIFIED: ICD-10-CM

## 2025-07-22 PROCEDURE — 36415 COLL VENOUS BLD VENIPUNCTURE: CPT

## 2025-07-22 PROCEDURE — G2211 COMPLEX E/M VISIT ADD ON: CPT

## 2025-07-22 PROCEDURE — 99214 OFFICE O/P EST MOD 30 MIN: CPT

## 2025-07-22 PROCEDURE — 93000 ELECTROCARDIOGRAM COMPLETE: CPT

## (undated) DEVICE — PACK HYBRID LHH

## (undated) DEVICE — CHEST DRAIN PLEUR-EVAC DRY/WET ADULT-PEDS SINGLE (QUICK)

## (undated) DEVICE — Device

## (undated) DEVICE — SUT SILK 5-0 60" TIES

## (undated) DEVICE — WARMING BLANKET FULL UNDERBODY

## (undated) DEVICE — CATH NG SALEM SUMP 16FR

## (undated) DEVICE — SUT DOUBLE 6 WIRE STERNAL

## (undated) DEVICE — TUBING EXTENSION HI PRESSURE FLEX 48"

## (undated) DEVICE — SUT PROLENE 6-0 30" RB-2

## (undated) DEVICE — PACING CABLE (BROWN) A/V TEMP SCREW DOWN 12FT

## (undated) DEVICE — DRAPE OR CAMERA COVER

## (undated) DEVICE — CATH CARDIAC RT CUSET 601201319

## (undated) DEVICE — DRAPE IOBAN 33" X 23"

## (undated) DEVICE — ELCTR ZOLL DEFIBRILLATOR PAD NO REPLACEMENT

## (undated) DEVICE — SUT SILK 2-0 18" SH (POP-OFF)

## (undated) DEVICE — SUT PLEDGET 9MM X 4MM X 1.5MM

## (undated) DEVICE — SUT VICRYL 1 36" CTX UNDYED

## (undated) DEVICE — SUT PLEDGET SOFT MEDIUM 1/4" X 1/8" X 1/16" X6

## (undated) DEVICE — SUT PROLENE 3-0 36" SH-1

## (undated) DEVICE — PREP SCRUB BRUSH W CHG 4%

## (undated) DEVICE — RIGID ADULT SUCKER

## (undated) DEVICE — SUT VICRYL 0 27" CT

## (undated) DEVICE — SUT VICRYL 4-0 18" PS-2 UNDYED

## (undated) DEVICE — TOURNIQUET SET 12FR (1 RED, 1 BLUE, 3 CLEAR, 1 SNARE) 7"

## (undated) DEVICE — CATH CV TRAY INSR ST UNIV

## (undated) DEVICE — SUT TICRON 2-0 36" CV-316 DA

## (undated) DEVICE — SUMP INTRACARDIAC/PERICARDIAL 20FR 1/4" ADULT

## (undated) DEVICE — NDL PERCU ECHOTIP 21G X 4CM

## (undated) DEVICE — FOLEY TRAY 16FR 5CC LF LUBRISIL ADVANCE TEMP CLOSED

## (undated) DEVICE — BAND RADIAL COMPRESSION DEVICE REG 24CM

## (undated) DEVICE — TUBING SUCTION NONCONDUCTIVE 6MM X 12FT

## (undated) DEVICE — ULTRASOUND COVER PROBE 14 X 122CM

## (undated) DEVICE — DRSG BIOPATCH DISK W CHG 1" W 4.0MM HOLE

## (undated) DEVICE — SYR LUER LOK 30CC

## (undated) DEVICE — DRSG TEGADERM 2.5 X 3"

## (undated) DEVICE — SYR LUER LOK 10CC

## (undated) DEVICE — DRSG QUICKCLOT HEMOSTATIC 4X4 FOIL

## (undated) DEVICE — PACK OPEN HEART LNX

## (undated) DEVICE — SYS DEL CONTROLLER ANGIOTOUCH

## (undated) DEVICE — DRSG MEPILEX 10 X 25CM (4 X 10") AG

## (undated) DEVICE — PACK PROC CV DRAPE

## (undated) DEVICE — SUT NUROLON 1 18" OS-8 (POP-OFF)

## (undated) DEVICE — SUT HOLDER INSERT FOR OCTOBASE STERNAL RETRACTOR

## (undated) DEVICE — SUT VICRYL 2-0 27" CT-1

## (undated) DEVICE — SUT STAINLESS STEEL 7 4-18" CCS

## (undated) DEVICE — MANIFOLD ANGIO AUTOMD TRNDCR

## (undated) DEVICE — TUBING IV EXTENSION 30"

## (undated) DEVICE — DRSG TRACH DRAINAGE 4X4

## (undated) DEVICE — SUT STAINLESS STEEL 6 4-18" CCS

## (undated) DEVICE — DRAPE SURGICAL #1010

## (undated) DEVICE — DRAPE SLUSH / WARMER 44 X 66"

## (undated) DEVICE — SYR MED A2000 SYRINGE KIT ACIST REUS

## (undated) DEVICE — MARKING PEN W RULER

## (undated) DEVICE — SUT PROLENE 4-0 36" RB-1

## (undated) DEVICE — POSITIONER FOAM EGG CRATE ULNAR 2PCS (PINK)

## (undated) DEVICE — DRAPE PROBE COVER 5" X 96"

## (undated) DEVICE — SUT ETHIBOND 3-0 36" RB-1